# Patient Record
Sex: MALE | Race: WHITE | Employment: OTHER | URBAN - METROPOLITAN AREA
[De-identification: names, ages, dates, MRNs, and addresses within clinical notes are randomized per-mention and may not be internally consistent; named-entity substitution may affect disease eponyms.]

---

## 2020-02-24 ENCOUNTER — HOSPITAL ENCOUNTER (OUTPATIENT)
Age: 65
Discharge: HOME | End: 2020-02-26

## 2020-02-24 PROCEDURE — 88305 TISSUE EXAM BY PATHOLOGIST: CPT

## 2020-06-23 ENCOUNTER — APPOINTMENT (OUTPATIENT)
Dept: GENERAL RADIOLOGY | Age: 65
End: 2020-06-23
Payer: MEDICARE

## 2020-06-23 ENCOUNTER — HOSPITAL ENCOUNTER (EMERGENCY)
Age: 65
Discharge: HOME | End: 2020-06-24
Attending: EMERGENCY MEDICINE
Payer: MEDICARE

## 2020-06-23 ENCOUNTER — APPOINTMENT (OUTPATIENT)
Dept: ULTRASOUND IMAGING | Age: 65
End: 2020-06-23
Payer: MEDICARE

## 2020-06-23 VITALS
DIASTOLIC BLOOD PRESSURE: 74 MMHG | TEMPERATURE: 98.2 F | OXYGEN SATURATION: 96 % | WEIGHT: 225 LBS | HEART RATE: 78 BPM | RESPIRATION RATE: 18 BRPM | BODY MASS INDEX: 32.21 KG/M2 | HEIGHT: 70 IN | SYSTOLIC BLOOD PRESSURE: 124 MMHG

## 2020-06-23 LAB
ALBUMIN SERPL-MCNC: 4.1 G/DL (ref 3.5–5.2)
ALP BLD-CCNC: 215 U/L (ref 40–129)
ALT SERPL-CCNC: 131 U/L (ref 0–40)
AMORPHOUS: ABNORMAL
ANION GAP SERPL CALCULATED.3IONS-SCNC: 11 MMOL/L (ref 7–16)
AST SERPL-CCNC: 110 U/L (ref 0–39)
BACTERIA: ABNORMAL /HPF
BASOPHILS ABSOLUTE: 0.04 E9/L (ref 0–0.2)
BASOPHILS RELATIVE PERCENT: 0.4 % (ref 0–2)
BILIRUB SERPL-MCNC: 0.6 MG/DL (ref 0–1.2)
BILIRUBIN URINE: ABNORMAL
BLOOD, URINE: NEGATIVE
BUN BLDV-MCNC: 21 MG/DL (ref 8–23)
CALCIUM SERPL-MCNC: 9.4 MG/DL (ref 8.6–10.2)
CELLULAR CASTS: ABNORMAL /LPF
CHLORIDE BLD-SCNC: 101 MMOL/L (ref 98–107)
CLARITY: CLEAR
CO2: 24 MMOL/L (ref 22–29)
COARSE CASTS, UA: ABNORMAL /LPF (ref 0–2)
COLOR: ABNORMAL
CREAT SERPL-MCNC: 1.3 MG/DL (ref 0.7–1.2)
EOSINOPHILS ABSOLUTE: 0.02 E9/L (ref 0.05–0.5)
EOSINOPHILS RELATIVE PERCENT: 0.2 % (ref 0–6)
EPITHELIAL CELLS, UA: ABNORMAL /HPF
GFR AFRICAN AMERICAN: >60
GFR NON-AFRICAN AMERICAN: 55 ML/MIN/1.73
GLUCOSE BLD-MCNC: 108 MG/DL (ref 74–99)
GLUCOSE URINE: NEGATIVE MG/DL
HCT VFR BLD CALC: 42.8 % (ref 37–54)
HEMOGLOBIN: 14.6 G/DL (ref 12.5–16.5)
HYALINE CASTS: ABNORMAL /LPF (ref 0–2)
IMMATURE GRANULOCYTES #: 0.04 E9/L
IMMATURE GRANULOCYTES %: 0.4 % (ref 0–5)
KETONES, URINE: ABNORMAL MG/DL
LACTIC ACID, SEPSIS: 0.9 MMOL/L (ref 0.5–1.9)
LEUKOCYTE ESTERASE, URINE: NEGATIVE
LYMPHOCYTES ABSOLUTE: 1.45 E9/L (ref 1.5–4)
LYMPHOCYTES RELATIVE PERCENT: 14.2 % (ref 20–42)
MCH RBC QN AUTO: 30.6 PG (ref 26–35)
MCHC RBC AUTO-ENTMCNC: 34.1 % (ref 32–34.5)
MCV RBC AUTO: 89.7 FL (ref 80–99.9)
MONOCYTES ABSOLUTE: 0.69 E9/L (ref 0.1–0.95)
MONOCYTES RELATIVE PERCENT: 6.7 % (ref 2–12)
MUCUS: PRESENT /LPF
NEUTROPHILS ABSOLUTE: 7.99 E9/L (ref 1.8–7.3)
NEUTROPHILS RELATIVE PERCENT: 78.1 % (ref 43–80)
NITRITE, URINE: NEGATIVE
PDW BLD-RTO: 13.1 FL (ref 11.5–15)
PH UA: 6 (ref 5–9)
PLATELET # BLD: 190 E9/L (ref 130–450)
PMV BLD AUTO: 11.3 FL (ref 7–12)
POTASSIUM SERPL-SCNC: 4 MMOL/L (ref 3.5–5)
PROTEIN UA: 100 MG/DL
RBC # BLD: 4.77 E12/L (ref 3.8–5.8)
RBC UA: ABNORMAL /HPF (ref 0–2)
SODIUM BLD-SCNC: 136 MMOL/L (ref 132–146)
SPECIFIC GRAVITY UA: >=1.03 (ref 1–1.03)
TOTAL PROTEIN: 8 G/DL (ref 6.4–8.3)
UROBILINOGEN, URINE: 1 E.U./DL
WBC # BLD: 10.2 E9/L (ref 4.5–11.5)
WBC UA: ABNORMAL /HPF (ref 0–5)

## 2020-06-23 PROCEDURE — 76705 ECHO EXAM OF ABDOMEN: CPT

## 2020-06-23 PROCEDURE — 99284 EMERGENCY DEPT VISIT MOD MDM: CPT

## 2020-06-23 PROCEDURE — U0003 INFECTIOUS AGENT DETECTION BY NUCLEIC ACID (DNA OR RNA); SEVERE ACUTE RESPIRATORY SYNDROME CORONAVIRUS 2 (SARS-COV-2) (CORONAVIRUS DISEASE [COVID-19]), AMPLIFIED PROBE TECHNIQUE, MAKING USE OF HIGH THROUGHPUT TECHNOLOGIES AS DESCRIBED BY CMS-2020-01-R: HCPCS

## 2020-06-23 PROCEDURE — 71045 X-RAY EXAM CHEST 1 VIEW: CPT

## 2020-06-23 PROCEDURE — 80053 COMPREHEN METABOLIC PANEL: CPT

## 2020-06-23 PROCEDURE — 93005 ELECTROCARDIOGRAM TRACING: CPT | Performed by: EMERGENCY MEDICINE

## 2020-06-23 PROCEDURE — 2580000003 HC RX 258: Performed by: EMERGENCY MEDICINE

## 2020-06-23 PROCEDURE — 83605 ASSAY OF LACTIC ACID: CPT

## 2020-06-23 PROCEDURE — 87040 BLOOD CULTURE FOR BACTERIA: CPT

## 2020-06-23 PROCEDURE — 85025 COMPLETE CBC W/AUTO DIFF WBC: CPT

## 2020-06-23 PROCEDURE — 81001 URINALYSIS AUTO W/SCOPE: CPT

## 2020-06-23 PROCEDURE — 6370000000 HC RX 637 (ALT 250 FOR IP): Performed by: EMERGENCY MEDICINE

## 2020-06-23 RX ORDER — 0.9 % SODIUM CHLORIDE 0.9 %
1000 INTRAVENOUS SOLUTION INTRAVENOUS ONCE
Status: COMPLETED | OUTPATIENT
Start: 2020-06-23 | End: 2020-06-23

## 2020-06-23 RX ORDER — ACETAMINOPHEN 500 MG
1000 TABLET ORAL ONCE
Status: COMPLETED | OUTPATIENT
Start: 2020-06-23 | End: 2020-06-23

## 2020-06-23 RX ADMIN — ACETAMINOPHEN 1000 MG: 500 TABLET ORAL at 20:12

## 2020-06-23 RX ADMIN — SODIUM CHLORIDE 1000 ML: 9 INJECTION, SOLUTION INTRAVENOUS at 20:05

## 2020-06-23 ASSESSMENT — PAIN SCALES - GENERAL
PAINLEVEL_OUTOF10: 0
PAINLEVEL_OUTOF10: 0

## 2020-06-23 NOTE — ED PROVIDER NOTES
EXAM--------------------------------------    Constitutional/General: Alert and oriented x3, well appearing, non toxic in NAD; smiling, pleasant  Head: Normocephalic and atraumatic  Eyes: PERRL, EOMI, conjunctive normal, sclera non icteric  Mouth: Oropharynx clear, handling secretions, no trismus, no asymmetry of the posterior oropharynx or uvular edema  Neck: Supple, full ROM, non tender to palpation in the midline, no stridor, no crepitus, no meningeal signs  Respiratory: Lungs clear to auscultation bilaterally, no wheezes, rales, or rhonchi. Not in respiratory distress  Cardiovascular:  Regular rate. Regular rhythm. No murmurs, gallops, or rubs. 2+ distal pulses  Chest: No chest wall tenderness  GI:  Abdomen Soft, Non tender, Non distended. +BS. No organomegaly, no palpable masses,  No rebound, guarding, or rigidity. Park sign, no McBurney's point tenderness. Musculoskeletal: Moves all extremities x 4. Warm and well perfused, no clubbing, cyanosis, or edema. Capillary refill <3 seconds  Integument: skin warm and dry. No rashes. Lymphatic: no lymphadenopathy noted  Neurologic: GCS 15, no focal deficits, symmetric strength 5/5 in the upper and lower extremities bilaterally  Psychiatric: Normal Affect    -------------------------------------------------- RESULTS -------------------------------------------------  I have personally reviewed all laboratory and imaging results for this patient. Results are listed below.      LABS:  Results for orders placed or performed during the hospital encounter of 06/23/20   CBC auto differential   Result Value Ref Range    WBC 10.2 4.5 - 11.5 E9/L    RBC 4.77 3.80 - 5.80 E12/L    Hemoglobin 14.6 12.5 - 16.5 g/dL    Hematocrit 42.8 37.0 - 54.0 %    MCV 89.7 80.0 - 99.9 fL    MCH 30.6 26.0 - 35.0 pg    MCHC 34.1 32.0 - 34.5 %    RDW 13.1 11.5 - 15.0 fL    Platelets 198 065 - 187 E9/L    MPV 11.3 7.0 - 12.0 fL    Neutrophils % 78.1 43.0 - 80.0 %    Immature Granulocytes % 0.4 0.0 - 5.0 %    Lymphocytes % 14.2 (L) 20.0 - 42.0 %    Monocytes % 6.7 2.0 - 12.0 %    Eosinophils % 0.2 0.0 - 6.0 %    Basophils % 0.4 0.0 - 2.0 %    Neutrophils Absolute 7.99 (H) 1.80 - 7.30 E9/L    Immature Granulocytes # 0.04 E9/L    Lymphocytes Absolute 1.45 (L) 1.50 - 4.00 E9/L    Monocytes Absolute 0.69 0.10 - 0.95 E9/L    Eosinophils Absolute 0.02 (L) 0.05 - 0.50 E9/L    Basophils Absolute 0.04 0.00 - 0.20 E9/L   Comprehensive metabolic panel   Result Value Ref Range    Sodium 136 132 - 146 mmol/L    Potassium 4.0 3.5 - 5.0 mmol/L    Chloride 101 98 - 107 mmol/L    CO2 24 22 - 29 mmol/L    Anion Gap 11 7 - 16 mmol/L    Glucose 108 (H) 74 - 99 mg/dL    BUN 21 8 - 23 mg/dL    CREATININE 1.3 (H) 0.7 - 1.2 mg/dL    GFR Non-African American 55 >=60 mL/min/1.73    GFR African American >60     Calcium 9.4 8.6 - 10.2 mg/dL    Total Protein 8.0 6.4 - 8.3 g/dL    Alb 4.1 3.5 - 5.2 g/dL    Total Bilirubin 0.6 0.0 - 1.2 mg/dL    Alkaline Phosphatase 215 (H) 40 - 129 U/L     (H) 0 - 40 U/L     (H) 0 - 39 U/L   Lactate, Sepsis   Result Value Ref Range    Lactic Acid, Sepsis 0.9 0.5 - 1.9 mmol/L   Urinalysis with Microscopic   Result Value Ref Range    Color, UA NICOLÁS (A) Straw/Yellow    Clarity, UA Clear Clear    Glucose, Ur Negative Negative mg/dL    Bilirubin Urine SMALL (A) Negative    Ketones, Urine TRACE (A) Negative mg/dL    Specific Gravity, UA >=1.030 1.005 - 1.030    Blood, Urine Negative Negative    pH, UA 6.0 5.0 - 9.0    Protein,  (A) Negative mg/dL    Urobilinogen, Urine 1.0 <2.0 E.U./dL    Nitrite, Urine Negative Negative    Leukocyte Esterase, Urine Negative Negative    Hyaline Casts, UA 0-2 0 - 2 /LPF    Coarse Casts, UA 3-5 (A) 0 - 2 /LPF    Cellular Cast, UA 1-3 (A) None Seen /LPF    Mucus, UA Present (A) None Seen /LPF    WBC, UA 1-3 0 - 5 /HPF    RBC, UA 2-5 0 - 2 /HPF    Epithelial Cells, UA FEW /HPF    Bacteria, UA MANY (A) None Seen /HPF    Amorphous, UA MODERATE    EKG 12 Lead Result Value Ref Range    Ventricular Rate 85 BPM    Atrial Rate 85 BPM    P-R Interval 140 ms    QRS Duration 80 ms    Q-T Interval 378 ms    QTc Calculation (Bazett) 449 ms    P Axis 23 degrees    R Axis 14 degrees    T Axis -6 degrees       RADIOLOGY:  Interpreted by Radiologist.  XR CHEST PORTABLE   Final Result      1. Appearance suggests minimal vascular congestion. EKG:  This EKG is signed and interpreted by the EP. Time: 19:34  Rate: 84  Rhythm: Sinus  Interpretation: no acute changes  Comparison: no previous EKG and None      ------------------------- NURSING NOTES AND VITALS REVIEWED ---------------------------   The nursing notes within the ED encounter and vital signs as below have been reviewed by myself. BP (!) 136/92   Pulse 84   Temp 101.5 °F (38.6 °C)   Resp 18   Ht 5' 10\" (1.778 m)   Wt 225 lb (102.1 kg)   SpO2 96%   BMI 32.28 kg/m²   Oxygen Saturation Interpretation: Normal    The patients available past medical records and past encounters were reviewed. ------------------------------ ED COURSE/MEDICAL DECISION MAKING----------------------  Medications   0.9 % sodium chloride bolus (1,000 mLs Intravenous New Bag 6/23/20 2005)   acetaminophen (TYLENOL) tablet 1,000 mg (1,000 mg Oral Given 6/23/20 2012)         ED COURSE:       Medical Decision Making:   Patient is a very pleasant 67-year-old gentleman who comes in with low-grade fevers for the past 4 days. He states his been around 100 101 at home. He states he said no other symptoms. No cough, no headache, no abdominal pain, no urinary complaints. However, he has no source for his fever. He is eating and drinking well at home. Patient with a septic work-up including blood work blood cultures will check urine chest x-ray as well as COVID testing. If testing is negative, I do feel patient be able to discharged home with close outpatient follow-up with his PCP.       Patient is awaiting lab work and

## 2020-06-24 LAB
EKG ATRIAL RATE: 84 BPM
EKG P AXIS: 17 DEGREES
EKG P-R INTERVAL: 148 MS
EKG Q-T INTERVAL: 362 MS
EKG QRS DURATION: 78 MS
EKG QTC CALCULATION (BAZETT): 427 MS
EKG R AXIS: 13 DEGREES
EKG T AXIS: -6 DEGREES
EKG VENTRICULAR RATE: 84 BPM

## 2020-06-24 PROCEDURE — 93010 ELECTROCARDIOGRAM REPORT: CPT | Performed by: INTERNAL MEDICINE

## 2020-06-25 ENCOUNTER — CARE COORDINATION (OUTPATIENT)
Dept: CASE MANAGEMENT | Age: 65
End: 2020-06-25

## 2020-06-25 LAB
SARS-COV-2: NOT DETECTED
SOURCE: NORMAL

## 2020-06-26 ENCOUNTER — CARE COORDINATION (OUTPATIENT)
Dept: CASE MANAGEMENT | Age: 65
End: 2020-06-26

## 2020-06-28 LAB
BLOOD CULTURE, ROUTINE: NORMAL
CULTURE, BLOOD 2: NORMAL

## 2022-04-21 ENCOUNTER — APPOINTMENT (OUTPATIENT)
Dept: GENERAL RADIOLOGY | Age: 67
DRG: 176 | End: 2022-04-21
Payer: MEDICARE

## 2022-04-21 ENCOUNTER — APPOINTMENT (OUTPATIENT)
Dept: CT IMAGING | Age: 67
DRG: 176 | End: 2022-04-21
Payer: MEDICARE

## 2022-04-21 ENCOUNTER — HOSPITAL ENCOUNTER (INPATIENT)
Age: 67
LOS: 2 days | Discharge: HOME | DRG: 176 | End: 2022-04-23
Attending: STUDENT IN AN ORGANIZED HEALTH CARE EDUCATION/TRAINING PROGRAM | Admitting: FAMILY MEDICINE
Payer: MEDICARE

## 2022-04-21 DIAGNOSIS — I26.99 OTHER ACUTE PULMONARY EMBOLISM, UNSPECIFIED WHETHER ACUTE COR PULMONALE PRESENT (HCC): Primary | ICD-10-CM

## 2022-04-21 LAB
ALBUMIN SERPL-MCNC: 4.5 G/DL (ref 3.5–5.2)
ALP BLD-CCNC: 71 U/L (ref 40–129)
ALT SERPL-CCNC: 14 U/L (ref 0–40)
ANION GAP SERPL CALCULATED.3IONS-SCNC: 12 MMOL/L (ref 7–16)
APTT: 35.5 SEC (ref 24.5–35.1)
AST SERPL-CCNC: 17 U/L (ref 0–39)
BASOPHILS ABSOLUTE: 0.06 E9/L (ref 0–0.2)
BASOPHILS RELATIVE PERCENT: 0.4 % (ref 0–2)
BILIRUB SERPL-MCNC: 0.8 MG/DL (ref 0–1.2)
BILIRUBIN URINE: ABNORMAL
BLOOD, URINE: NEGATIVE
BUN BLDV-MCNC: 20 MG/DL (ref 6–23)
CALCIUM SERPL-MCNC: 9.8 MG/DL (ref 8.6–10.2)
CHLORIDE BLD-SCNC: 102 MMOL/L (ref 98–107)
CLARITY: CLEAR
CO2: 27 MMOL/L (ref 22–29)
COLOR: YELLOW
CREAT SERPL-MCNC: 1.2 MG/DL (ref 0.7–1.2)
D DIMER: 858 NG/ML DDU
EOSINOPHILS ABSOLUTE: 0.1 E9/L (ref 0.05–0.5)
EOSINOPHILS RELATIVE PERCENT: 0.7 % (ref 0–6)
GFR AFRICAN AMERICAN: >60
GFR NON-AFRICAN AMERICAN: >60 ML/MIN/1.73
GLUCOSE BLD-MCNC: 111 MG/DL (ref 74–99)
GLUCOSE URINE: NEGATIVE MG/DL
HCT VFR BLD CALC: 42.8 % (ref 37–54)
HCT VFR BLD CALC: 46.2 % (ref 37–54)
HEMOGLOBIN: 14.3 G/DL (ref 12.5–16.5)
HEMOGLOBIN: 15.5 G/DL (ref 12.5–16.5)
IMMATURE GRANULOCYTES #: 0.04 E9/L
IMMATURE GRANULOCYTES %: 0.3 % (ref 0–5)
KETONES, URINE: 15 MG/DL
LEUKOCYTE ESTERASE, URINE: NEGATIVE
LYMPHOCYTES ABSOLUTE: 2.18 E9/L (ref 1.5–4)
LYMPHOCYTES RELATIVE PERCENT: 15.4 % (ref 20–42)
MAGNESIUM: 2 MG/DL (ref 1.6–2.6)
MCH RBC QN AUTO: 30.4 PG (ref 26–35)
MCH RBC QN AUTO: 30.5 PG (ref 26–35)
MCHC RBC AUTO-ENTMCNC: 33.4 % (ref 32–34.5)
MCHC RBC AUTO-ENTMCNC: 33.5 % (ref 32–34.5)
MCV RBC AUTO: 90.9 FL (ref 80–99.9)
MCV RBC AUTO: 91.1 FL (ref 80–99.9)
MONOCYTES ABSOLUTE: 1.08 E9/L (ref 0.1–0.95)
MONOCYTES RELATIVE PERCENT: 7.6 % (ref 2–12)
NEUTROPHILS ABSOLUTE: 10.72 E9/L (ref 1.8–7.3)
NEUTROPHILS RELATIVE PERCENT: 75.6 % (ref 43–80)
NITRITE, URINE: NEGATIVE
PDW BLD-RTO: 12.6 FL (ref 11.5–15)
PDW BLD-RTO: 12.6 FL (ref 11.5–15)
PH UA: 6 (ref 5–9)
PLATELET # BLD: 197 E9/L (ref 130–450)
PLATELET # BLD: 223 E9/L (ref 130–450)
PMV BLD AUTO: 11.1 FL (ref 7–12)
PMV BLD AUTO: 11.3 FL (ref 7–12)
POTASSIUM SERPL-SCNC: 5 MMOL/L (ref 3.5–5)
PRO-BNP: 30 PG/ML (ref 0–125)
PROTEIN UA: NEGATIVE MG/DL
RBC # BLD: 4.7 E12/L (ref 3.8–5.8)
RBC # BLD: 5.08 E12/L (ref 3.8–5.8)
REASON FOR REJECTION: NORMAL
REJECTED TEST: NORMAL
SODIUM BLD-SCNC: 141 MMOL/L (ref 132–146)
SPECIFIC GRAVITY UA: 1.02 (ref 1–1.03)
TOTAL PROTEIN: 7.7 G/DL (ref 6.4–8.3)
TROPONIN, HIGH SENSITIVITY: 13 NG/L (ref 0–11)
TROPONIN, HIGH SENSITIVITY: 14 NG/L (ref 0–11)
UROBILINOGEN, URINE: 0.2 E.U./DL
WBC # BLD: 14.2 E9/L (ref 4.5–11.5)
WBC # BLD: 14.3 E9/L (ref 4.5–11.5)

## 2022-04-21 PROCEDURE — 6370000000 HC RX 637 (ALT 250 FOR IP): Performed by: STUDENT IN AN ORGANIZED HEALTH CARE EDUCATION/TRAINING PROGRAM

## 2022-04-21 PROCEDURE — 81003 URINALYSIS AUTO W/O SCOPE: CPT

## 2022-04-21 PROCEDURE — 2140000000 HC CCU INTERMEDIATE R&B

## 2022-04-21 PROCEDURE — 36415 COLL VENOUS BLD VENIPUNCTURE: CPT

## 2022-04-21 PROCEDURE — 2580000003 HC RX 258: Performed by: FAMILY MEDICINE

## 2022-04-21 PROCEDURE — 71275 CT ANGIOGRAPHY CHEST: CPT

## 2022-04-21 PROCEDURE — 83735 ASSAY OF MAGNESIUM: CPT

## 2022-04-21 PROCEDURE — 85027 COMPLETE CBC AUTOMATED: CPT

## 2022-04-21 PROCEDURE — 96365 THER/PROPH/DIAG IV INF INIT: CPT

## 2022-04-21 PROCEDURE — 6360000002 HC RX W HCPCS: Performed by: STUDENT IN AN ORGANIZED HEALTH CARE EDUCATION/TRAINING PROGRAM

## 2022-04-21 PROCEDURE — 80053 COMPREHEN METABOLIC PANEL: CPT

## 2022-04-21 PROCEDURE — 93005 ELECTROCARDIOGRAM TRACING: CPT | Performed by: NURSE PRACTITIONER

## 2022-04-21 PROCEDURE — 71046 X-RAY EXAM CHEST 2 VIEWS: CPT

## 2022-04-21 PROCEDURE — 83880 ASSAY OF NATRIURETIC PEPTIDE: CPT

## 2022-04-21 PROCEDURE — 2580000003 HC RX 258: Performed by: STUDENT IN AN ORGANIZED HEALTH CARE EDUCATION/TRAINING PROGRAM

## 2022-04-21 PROCEDURE — 99285 EMERGENCY DEPT VISIT HI MDM: CPT

## 2022-04-21 PROCEDURE — 85025 COMPLETE CBC W/AUTO DIFF WBC: CPT

## 2022-04-21 PROCEDURE — 85378 FIBRIN DEGRADE SEMIQUANT: CPT

## 2022-04-21 PROCEDURE — 85730 THROMBOPLASTIN TIME PARTIAL: CPT

## 2022-04-21 PROCEDURE — 6360000004 HC RX CONTRAST MEDICATION: Performed by: RADIOLOGY

## 2022-04-21 PROCEDURE — 84484 ASSAY OF TROPONIN QUANT: CPT

## 2022-04-21 RX ORDER — HEPARIN SODIUM 1000 [USP'U]/ML
80 INJECTION, SOLUTION INTRAVENOUS; SUBCUTANEOUS ONCE
Status: COMPLETED | OUTPATIENT
Start: 2022-04-21 | End: 2022-04-21

## 2022-04-21 RX ORDER — SODIUM CHLORIDE 9 MG/ML
INJECTION, SOLUTION INTRAVENOUS PRN
Status: DISCONTINUED | OUTPATIENT
Start: 2022-04-21 | End: 2022-04-23 | Stop reason: HOSPADM

## 2022-04-21 RX ORDER — 0.9 % SODIUM CHLORIDE 0.9 %
1000 INTRAVENOUS SOLUTION INTRAVENOUS ONCE
Status: COMPLETED | OUTPATIENT
Start: 2022-04-21 | End: 2022-04-21

## 2022-04-21 RX ORDER — ACETAMINOPHEN 650 MG/1
650 SUPPOSITORY RECTAL EVERY 6 HOURS PRN
Status: DISCONTINUED | OUTPATIENT
Start: 2022-04-21 | End: 2022-04-23 | Stop reason: HOSPADM

## 2022-04-21 RX ORDER — ACETAMINOPHEN 325 MG/1
650 TABLET ORAL EVERY 6 HOURS PRN
Status: DISCONTINUED | OUTPATIENT
Start: 2022-04-21 | End: 2022-04-23 | Stop reason: HOSPADM

## 2022-04-21 RX ORDER — POLYETHYLENE GLYCOL 3350 17 G/17G
17 POWDER, FOR SOLUTION ORAL DAILY PRN
Status: DISCONTINUED | OUTPATIENT
Start: 2022-04-21 | End: 2022-04-23 | Stop reason: HOSPADM

## 2022-04-21 RX ORDER — HEPARIN SODIUM 10000 [USP'U]/100ML
5-30 INJECTION, SOLUTION INTRAVENOUS CONTINUOUS
Status: DISCONTINUED | OUTPATIENT
Start: 2022-04-21 | End: 2022-04-23

## 2022-04-21 RX ORDER — HEPARIN SODIUM 1000 [USP'U]/ML
80 INJECTION, SOLUTION INTRAVENOUS; SUBCUTANEOUS PRN
Status: DISCONTINUED | OUTPATIENT
Start: 2022-04-21 | End: 2022-04-23 | Stop reason: HOSPADM

## 2022-04-21 RX ORDER — SODIUM CHLORIDE 0.9 % (FLUSH) 0.9 %
10 SYRINGE (ML) INJECTION PRN
Status: DISCONTINUED | OUTPATIENT
Start: 2022-04-21 | End: 2022-04-23 | Stop reason: HOSPADM

## 2022-04-21 RX ORDER — HEPARIN SODIUM 1000 [USP'U]/ML
40 INJECTION, SOLUTION INTRAVENOUS; SUBCUTANEOUS PRN
Status: DISCONTINUED | OUTPATIENT
Start: 2022-04-21 | End: 2022-04-23 | Stop reason: HOSPADM

## 2022-04-21 RX ORDER — ONDANSETRON 2 MG/ML
4 INJECTION INTRAMUSCULAR; INTRAVENOUS EVERY 6 HOURS PRN
Status: DISCONTINUED | OUTPATIENT
Start: 2022-04-21 | End: 2022-04-23 | Stop reason: HOSPADM

## 2022-04-21 RX ORDER — PROMETHAZINE HYDROCHLORIDE 25 MG/1
12.5 TABLET ORAL EVERY 6 HOURS PRN
Status: DISCONTINUED | OUTPATIENT
Start: 2022-04-21 | End: 2022-04-23 | Stop reason: HOSPADM

## 2022-04-21 RX ORDER — SODIUM CHLORIDE 0.9 % (FLUSH) 0.9 %
10 SYRINGE (ML) INJECTION EVERY 12 HOURS SCHEDULED
Status: DISCONTINUED | OUTPATIENT
Start: 2022-04-21 | End: 2022-04-23 | Stop reason: HOSPADM

## 2022-04-21 RX ORDER — ACETAMINOPHEN 325 MG/1
650 TABLET ORAL ONCE
Status: COMPLETED | OUTPATIENT
Start: 2022-04-21 | End: 2022-04-21

## 2022-04-21 RX ADMIN — HEPARIN SODIUM 8170 UNITS: 1000 INJECTION INTRAVENOUS; SUBCUTANEOUS at 19:11

## 2022-04-21 RX ADMIN — ACETAMINOPHEN 650 MG: 325 TABLET ORAL at 19:00

## 2022-04-21 RX ADMIN — Medication 10 ML: at 22:09

## 2022-04-21 RX ADMIN — SODIUM CHLORIDE 1000 ML: 9 INJECTION, SOLUTION INTRAVENOUS at 18:30

## 2022-04-21 RX ADMIN — Medication 18 UNITS/KG/HR: at 19:11

## 2022-04-21 RX ADMIN — IOPAMIDOL 70 ML: 755 INJECTION, SOLUTION INTRAVENOUS at 18:01

## 2022-04-21 ASSESSMENT — PAIN - FUNCTIONAL ASSESSMENT: PAIN_FUNCTIONAL_ASSESSMENT: 0-10

## 2022-04-21 ASSESSMENT — PAIN SCALES - GENERAL
PAINLEVEL_OUTOF10: 1
PAINLEVEL_OUTOF10: 6
PAINLEVEL_OUTOF10: 0
PAINLEVEL_OUTOF10: 0

## 2022-04-21 ASSESSMENT — PAIN DESCRIPTION - LOCATION: LOCATION: CHEST

## 2022-04-21 NOTE — ED NOTES
Department of Emergency Medicine  FIRST PROVIDER TRIAGE NOTE             Independent MLP           4/21/22  2:52 PM EDT    Date of Encounter: 4/21/22   MRN: [id number]      HPI: Asa Mariee is a 78 y.o. male who presents to the ED for Chest Pain (sent in from Dr Triny Shelton office, abonormal ekg, left sided cp, no lightheaded/dizziness, no sob )  Complaints of left-sided chest pain which he states began earlier today when he was walking the dog. He states the pain is worse when he leans forward. He did have some abdominal pain of the last couple days now complaining of a little bit of pain to the mid scapular region. Denies any lightheaded or dizziness. Denies any shortness of breath. He was seen by primary care physician earlier today and was subsequently sent here for further evaluation. He has no past medical history and is not on any daily medications. He took ibuprofen yesterday. ROS: Negative for sob. PE: Gen Appearance/Constitutional: alert  CV: tachycardia     Initial Plan of Care: All treatment areas with department are currently occupied. Plan to order/Initiate the following while awaiting opening in ED: labs, EKG and imaging studies.   Initiate Treatment-Testing, Proceed toTreatment Area When Bed Available for ED Attending/MLP to Continue Care    Electronically signed by LEOBARDO Mckinney CNP   DD: 4/21/22         LEOBARDO Wolf CNP  04/21/22 [id number]

## 2022-04-21 NOTE — ED PROVIDER NOTES
Department of Emergency Medicine   ED  Provider Note  Admit Date/RoomTime: 4/21/2022  5:16 PM  ED Room: 6502/6502-A          History of Present Illness:  4/21/22, Time: 6:29 PM EDT  Chief Complaint   Patient presents with    Chest Pain     sent in from Dr Margareth Ramirez office, abonormal ekg, left sided cp, no lightheaded/dizziness, no sob        Albino Juju is a 78 y.o. male presenting to the ED for chest pain. The patient states that he has been having chest pain seems to worsen with exertion. Nothing improves it. Yesterday he did have some back pain but this is resolved. No history of trauma. No numbness, tingling or weakness. He does feel short of breath with exertion. No fevers or cough. Denies any leg swelling, history of blood clots, recent travel or hormone use. No nausea, vomiting or diarrhea. .  It worsened this morning and has been more constant. He describes a throbbing heaviness that is across the front of his chest.  The patient does note that his primary care physician saw him today and felt his EKG was abnormal so sent him to the emergency department to be seen. Review of Systems:   Constitutional symptoms: Denies fever  Eyes: Denies eye pain  Ears, Nose, Mouth, Throat: Denies ear pain  Cardiovascular: Positive for chest pain  Respiratory: Positive for shortness of breath  Gastrointestinal: Denies blood per rectum  Genitourinary: Denies hematuria  Skin: Denies rashes  Neurological: Denies headache  Musculoskeletal: Denies extremity pain    --------------------------------------------- PAST HISTORY ---------------------------------------------  Past Medical History:  has no past medical history on file. Past Surgical History:  has a past surgical history that includes Tonsillectomy and fracture surgery. Social History:  reports that he has never smoked. His smokeless tobacco use includes chew. He reports previous alcohol use. He reports previous drug use.     Family History: Immature Granulocytes % 0.3 0.0 - 5.0 %    Lymphocytes % 15.4 (L) 20.0 - 42.0 %    Monocytes % 7.6 2.0 - 12.0 %    Eosinophils % 0.7 0.0 - 6.0 %    Basophils % 0.4 0.0 - 2.0 %    Neutrophils Absolute 10.72 (H) 1.80 - 7.30 E9/L    Immature Granulocytes # 0.04 E9/L    Lymphocytes Absolute 2.18 1.50 - 4.00 E9/L    Monocytes Absolute 1.08 (H) 0.10 - 0.95 E9/L    Eosinophils Absolute 0.10 0.05 - 0.50 E9/L    Basophils Absolute 0.06 0.00 - 0.20 E9/L   Comprehensive Metabolic Panel   Result Value Ref Range    Sodium 141 132 - 146 mmol/L    Potassium 5.0 3.5 - 5.0 mmol/L    Chloride 102 98 - 107 mmol/L    CO2 27 22 - 29 mmol/L    Anion Gap 12 7 - 16 mmol/L    Glucose 111 (H) 74 - 99 mg/dL    BUN 20 6 - 23 mg/dL    CREATININE 1.2 0.7 - 1.2 mg/dL    GFR Non-African American >60 >=60 mL/min/1.73    GFR African American >60     Calcium 9.8 8.6 - 10.2 mg/dL    Total Protein 7.7 6.4 - 8.3 g/dL    Albumin 4.5 3.5 - 5.2 g/dL    Total Bilirubin 0.8 0.0 - 1.2 mg/dL    Alkaline Phosphatase 71 40 - 129 U/L    ALT 14 0 - 40 U/L    AST 17 0 - 39 U/L   D-Dimer, Quantitative   Result Value Ref Range    D-Dimer, Quant 858 ng/mL DDU   Brain Natriuretic Peptide   Result Value Ref Range    Pro-BNP 30 0 - 125 pg/mL   Magnesium   Result Value Ref Range    Magnesium 2.0 1.6 - 2.6 mg/dL   Urinalysis   Result Value Ref Range    Color, UA Yellow Straw/Yellow    Clarity, UA Clear Clear    Glucose, Ur Negative Negative mg/dL    Bilirubin Urine SMALL (A) Negative    Ketones, Urine 15 (A) Negative mg/dL    Specific Gravity, UA 1.025 1.005 - 1.030    Blood, Urine Negative Negative    pH, UA 6.0 5.0 - 9.0    Protein, UA Negative Negative mg/dL    Urobilinogen, Urine 0.2 <2.0 E.U./dL    Nitrite, Urine Negative Negative    Leukocyte Esterase, Urine Negative Negative   Troponin   Result Value Ref Range    Troponin, High Sensitivity 13 (H) 0 - 11 ng/L   SPECIMEN REJECTION   Result Value Ref Range    Rejected Test trp5     Reason for Rejection see below CBC   Result Value Ref Range    WBC 14.3 (H) 4.5 - 11.5 E9/L    RBC 4.70 3.80 - 5.80 E12/L    Hemoglobin 14.3 12.5 - 16.5 g/dL    Hematocrit 42.8 37.0 - 54.0 %    MCV 91.1 80.0 - 99.9 fL    MCH 30.4 26.0 - 35.0 pg    MCHC 33.4 32.0 - 34.5 %    RDW 12.6 11.5 - 15.0 fL    Platelets 059 675 - 600 E9/L    MPV 11.1 7.0 - 12.0 fL   APTT   Result Value Ref Range    aPTT 35.5 (H) 24.5 - 35.1 sec   EKG 12 Lead   Result Value Ref Range    Ventricular Rate 109 BPM    Atrial Rate 109 BPM    P-R Interval 150 ms    QRS Duration 74 ms    Q-T Interval 338 ms    QTc Calculation (Bazett) 455 ms    P Axis 60 degrees    R Axis 31 degrees    T Axis 5 degrees   ,     RADIOLOGY:  Interpreted by Radiologist unless otherwise specified  CTA CHEST W CONTRAST   Final Result   1. Pulmonary embolism involving at least the bilateral lower lobes with   associated right heart strain. 2. Bibasilar airspace opacities likely represent atelectasis, left greater   than right. There is mild elevation of left hemidiaphragm. 3. Small left pleural effusion. Findings discussed with Kayleigh Giraldo via telephone on 04/21/2022 at 1828   hours [facility] daylight time. XR CHEST (2 VW)   Final Result   Opacities are present in left infrahilar location suggesting subsegmental   atelectasis or pneumonia. Short-term follow-up radiographic follow-up or CT   could be helpful for further evaluation.             ------------------------- NURSING NOTES AND VITALS REVIEWED ---------------------------   The nursing notes within the ED encounter and vital signs as below have been reviewed by myself  BP (!) 129/57   Pulse 84   Temp 99 °F (37.2 °C) (Oral)   Resp 18   Ht 5' 10\" (1.778 m)   Wt 237 lb 12.8 oz (107.9 kg)   SpO2 94%   BMI 34.12 kg/m²     Oxygen Saturation Interpretation: Normal    The patients available past medical records and past encounters were reviewed.       ------------------------------ ED COURSE/MEDICAL DECISION MAKING----------------------  Medications   perflutren lipid microspheres (DEFINITY) injection 1.65 mg (has no administration in time range)   heparin (porcine) injection 8,170 Units (has no administration in time range)   heparin (porcine) injection 4,080 Units (has no administration in time range)   heparin 25,000 units in dextrose 5% 250 mL (premix) infusion (18 Units/kg/hr × 102.1 kg IntraVENous Rate/Dose Verify 4/21/22 2149)   sodium chloride flush 0.9 % injection 10 mL (10 mLs IntraVENous Given 4/21/22 2209)   sodium chloride flush 0.9 % injection 10 mL (has no administration in time range)   0.9 % sodium chloride infusion (has no administration in time range)   promethazine (PHENERGAN) tablet 12.5 mg (has no administration in time range)     Or   ondansetron (ZOFRAN) injection 4 mg (has no administration in time range)   polyethylene glycol (GLYCOLAX) packet 17 g (has no administration in time range)   acetaminophen (TYLENOL) tablet 650 mg (has no administration in time range)     Or   acetaminophen (TYLENOL) suppository 650 mg (has no administration in time range)   0.9 % sodium chloride bolus (0 mLs IntraVENous Stopped 4/21/22 2026)   iopamidol (ISOVUE-370) 76 % injection 70 mL (70 mLs IntraVENous Given 4/21/22 1801)   acetaminophen (TYLENOL) tablet 650 mg (650 mg Oral Given by Other 4/21/22 1900)   heparin (porcine) injection 8,170 Units (8,170 Units IntraVENous Given 4/21/22 1911)       Medical Decision Making: This is a 78 y.o. male presenting to the ED for chest pain. On initial presentation, the patient's vital signs are interpreted as hypertensive, tachycardic and saturating well on room air. He is borderline febrile. Based on history and physical exam, we have a broad differential.  We are concerned for ACS, arrhythmia, pneumonia, and cannot exclude PE. Chest x-ray, EKG and laboratory studies obtained. Patient hydrated with a liter bolus and given Tylenol for his temperature.     A 12-lead EKG was performed and interpreted by myself. The rate is 109 with sinus tachycardia and normal axis. Patient has a T wave inversion in lead III. There is RSR primary as well. No acute ST elevation or depression. Good R wave progression. The KS interval is 150, QRS interval is 74, and QTC interval is 455. This is sinus tachycardia with nonspecific abnormality. Laboratory studies show electrolytes within normal limits. LFTs normal.  Patient does have a leukocytosis but no anemia. D-dimer positive. Urinalysis shows no infection. Chest x-ray shows opacities suggesting subsegmental atelectasis or pneumonia. CT shows evidence of pulmonary embolism involving the bilateral lower lobes with right heart strain. Bibasilar appears opacities which likely represent atelectasis. Small fusion. This is interpreted by radiology. Patient's vitals are stable at the bedside. He is not requiring oxygen. He is no longer tachycardic. We will obtain echocardiogram to assess further for right heart strain. As his BNP and troponin are not significant elevated we will start heparin. Pulmonology consulted and I spoke with Dr. Dann Galvan. Repeat troponin is 13. Primary care physician is unassigned. I spoke with medicine who is agreeable to the admission. I spoke with Dr. Librado Paul. Upon my evaluation, this patient had a high probability of imminent or life-threatening deterioration due to pulmonary embolism, which required my direct attention, intervention, and personal management. I have personally provided 33 minutes of critical care time excluding time spent on separately billable procedures. Time includes review of laboratory data, radiology results, discussion with consultants, and monitoring for potential decompensation. Interventions were performed as documented above.     This patient's ED course included: a personal history and physicial examination, re-evaluation prior to disposition, IV medications, cardiac monitoring and continuous pulse oximetry    This patient has been closely monitored during their ED course. Consultations:  Internal medicine, pulmonology    The cardiac monitor revealed sinus rhythm with a heart rate in the 80s as interpreted by me. The cardiac monitor was ordered secondary to the patient's chest pain and to monitor the patient for dysrhythmia. CPT [id number]    Oxygen Saturation Interpretation: 95 % on room air. Normal    Counseling:   I have spoken with the patient and spouse/SO and discussed todays results, in addition to providing specific details for the plan of care and counseling regarding the diagnosis and prognosis. Questions are answered at this time and they are agreeable with the plan.     --------------------------------- IMPRESSION AND DISPOSITION ---------------------------------    IMPRESSION  1. Other acute pulmonary embolism, unspecified whether acute cor pulmonale present ([facility] [address]        DISPOSITION  Disposition: Admit to telemetry  Patient condition is fair    IDr. Jazmyn, job the primary provider of record    NOTE: This report was transcribed using voice recognition software.  Every effort was made to ensure accuracy; however, inadvertent computerized transcription errors may be present       Jazmyn Frazier MD  04/22/22 8438

## 2022-04-21 NOTE — ED NOTES
Received report from [facility]; pt resting in bed; wife at bedside; vitals stable will monitor     Lauri William RN  04/21/22 Gabbie Bouquet

## 2022-04-22 LAB
ANION GAP SERPL CALCULATED.3IONS-SCNC: 11 MMOL/L (ref 7–16)
APTT: 194.9 SEC (ref 24.5–35.1)
APTT: 56.2 SEC (ref 24.5–35.1)
BASOPHILS ABSOLUTE: 0.06 E9/L (ref 0–0.2)
BASOPHILS RELATIVE PERCENT: 0.5 % (ref 0–2)
BUN BLDV-MCNC: 18 MG/DL (ref 6–23)
CALCIUM SERPL-MCNC: 8.9 MG/DL (ref 8.6–10.2)
CHLORIDE BLD-SCNC: 102 MMOL/L (ref 98–107)
CO2: 24 MMOL/L (ref 22–29)
CREAT SERPL-MCNC: 1.1 MG/DL (ref 0.7–1.2)
EKG ATRIAL RATE: 109 BPM
EKG P AXIS: 60 DEGREES
EKG P-R INTERVAL: 150 MS
EKG Q-T INTERVAL: 338 MS
EKG QRS DURATION: 74 MS
EKG QTC CALCULATION (BAZETT): 455 MS
EKG R AXIS: 31 DEGREES
EKG T AXIS: 5 DEGREES
EKG VENTRICULAR RATE: 109 BPM
EOSINOPHILS ABSOLUTE: 0.11 E9/L (ref 0.05–0.5)
EOSINOPHILS RELATIVE PERCENT: 0.8 % (ref 0–6)
GFR AFRICAN AMERICAN: >60
GFR NON-AFRICAN AMERICAN: >60 ML/MIN/1.73
GLUCOSE BLD-MCNC: 111 MG/DL (ref 74–99)
HCT VFR BLD CALC: 43.4 % (ref 37–54)
HEMOGLOBIN: 14.6 G/DL (ref 12.5–16.5)
IMMATURE GRANULOCYTES #: 0.07 E9/L
IMMATURE GRANULOCYTES %: 0.5 % (ref 0–5)
LYMPHOCYTES ABSOLUTE: 2.37 E9/L (ref 1.5–4)
LYMPHOCYTES RELATIVE PERCENT: 18.2 % (ref 20–42)
MCH RBC QN AUTO: 30.7 PG (ref 26–35)
MCHC RBC AUTO-ENTMCNC: 33.6 % (ref 32–34.5)
MCV RBC AUTO: 91.2 FL (ref 80–99.9)
MONOCYTES ABSOLUTE: 1.13 E9/L (ref 0.1–0.95)
MONOCYTES RELATIVE PERCENT: 8.7 % (ref 2–12)
NEUTROPHILS ABSOLUTE: 9.26 E9/L (ref 1.8–7.3)
NEUTROPHILS RELATIVE PERCENT: 71.3 % (ref 43–80)
PDW BLD-RTO: 12.7 FL (ref 11.5–15)
PLATELET # BLD: 224 E9/L (ref 130–450)
PMV BLD AUTO: 11.6 FL (ref 7–12)
POTASSIUM REFLEX MAGNESIUM: 4.3 MMOL/L (ref 3.5–5)
RBC # BLD: 4.76 E12/L (ref 3.8–5.8)
SODIUM BLD-SCNC: 137 MMOL/L (ref 132–146)
WBC # BLD: 13 E9/L (ref 4.5–11.5)

## 2022-04-22 PROCEDURE — 99223 1ST HOSP IP/OBS HIGH 75: CPT | Performed by: INTERNAL MEDICINE

## 2022-04-22 PROCEDURE — 85730 THROMBOPLASTIN TIME PARTIAL: CPT

## 2022-04-22 PROCEDURE — 6370000000 HC RX 637 (ALT 250 FOR IP): Performed by: FAMILY MEDICINE

## 2022-04-22 PROCEDURE — 85025 COMPLETE CBC W/AUTO DIFF WBC: CPT

## 2022-04-22 PROCEDURE — 2140000000 HC CCU INTERMEDIATE R&B

## 2022-04-22 PROCEDURE — 36415 COLL VENOUS BLD VENIPUNCTURE: CPT

## 2022-04-22 PROCEDURE — 6360000002 HC RX W HCPCS: Performed by: STUDENT IN AN ORGANIZED HEALTH CARE EDUCATION/TRAINING PROGRAM

## 2022-04-22 PROCEDURE — 2580000003 HC RX 258: Performed by: FAMILY MEDICINE

## 2022-04-22 PROCEDURE — 80048 BASIC METABOLIC PNL TOTAL CA: CPT

## 2022-04-22 RX ADMIN — Medication 10 ML: at 21:19

## 2022-04-22 RX ADMIN — ACETAMINOPHEN 650 MG: 325 TABLET ORAL at 23:31

## 2022-04-22 RX ADMIN — Medication 15 UNITS/KG/HR: at 10:39

## 2022-04-22 ASSESSMENT — PAIN SCALES - GENERAL: PAINLEVEL_OUTOF10: 0

## 2022-04-22 NOTE — PROGRESS NOTES
Hospitalist Progress Note      SYNOPSIS: Patient admitted on 2022 for Acute pulmonary embolism (HCC)      SUBJECTIVE:on heparin iv   Feels well    Temp (24hrs), Av °F (37.2 °C), Min:98.8 °F (37.1 °C), Max:99.4 °F (37.4 °C)    DIET: ADULT DIET; Regular  CODE: Full Code    Intake/Output Summary (Last 24 hours) at 2022 1708  Last data filed at 2022 1627  Gross per 24 hour   Intake 1730.27 ml   Output 560 ml   Net 1170.27 ml       OBJECTIVE:    /80   Pulse 87   Temp 99.1 °F (37.3 °C) (Oral)   Resp 28   Ht 5' 10\" (1.778 m)   Wt 237 lb 12.8 oz (107.9 kg)   SpO2 95%   BMI 34.12 kg/m²     General appearance: No apparent distress, appears stated age and cooperative. HEENT:  Conjunctivae/corneas clear. Neck: Supple. No jugular venous distention. Respiratory: Clear to auscultation bilaterally, normal respiratory effort  Cardiovascular: Regular rate rhythm, normal S1-S2  Abdomen: Soft, nontender, nondistended  Musculoskeletal: No clubbing, cyanosis, no bilateral lower extremity edema. Brisk capillary refill.    Skin:  No rashes  on visible skin  Neurologic: awake, alert and following commands     ASSESSMENT:    Pulmon emboli     PLAN:    hepari gt  Protocol  Pulmon recommneds transit to [address] [place] [address] in 48hrs  02 support    DISPOSITION: home     Medications:  REVIEWED DAILY    Infusion Medications    heparin (PORCINE) Infusion 15 Units/kg/hr (22 1039)    sodium chloride       Scheduled Medications    sodium chloride flush  10 mL IntraVENous 2 times per day     PRN Meds: perflutren lipid microspheres, heparin (porcine), heparin (porcine), sodium chloride flush, sodium chloride, promethazine **OR** ondansetron, polyethylene glycol, acetaminophen **OR** acetaminophen    Labs:     Recent Labs     22  1455 22  1841 22  0545   WBC 14.2* 14.3* 13.0*   HGB 15.5 14.3 14.6   HCT 46.2 42.8 43.4    197 224       Recent Labs     22  1455 22  0545    137   K 5.0 4.3    102   CO2 27 24   BUN 20 18   CREATININE 1.2 1.1   CALCIUM 9.8 8.9       Recent Labs     04/21/22  1455   PROT 7.7   ALKPHOS 71   ALT 14   AST 17   BILITOT 0.8         Radiology:   +++++++++++++++++++++++++++++++++++++++++++++++++  Danny Hall DO  [address]  +++++++++++++++++++++++++++++++++++++++++++++++++  NOTE: This report was transcribed using voice recognition software. Every effort was made to ensure accuracy; however, inadvertent computerized transcription errors may be present.

## 2022-04-22 NOTE — PATIENT CARE CONFERENCE
P Quality Flow/Interdisciplinary Rounds Progress Note        Quality Flow Rounds held on April 22, 2022    Disciplines Attending:  Bedside Nurse, ,  and Nursing Unit Leadership    Ramandeep Cho was admitted on 4/21/2022  5:16 PM    Anticipated Discharge Date:       Disposition:    Houston Score:  Houston Scale Score: 20    Readmission Risk              Risk of Unplanned Readmission:  8           Discussed patient goal for the day, patient clinical progression, and barriers to discharge.   The following Goal(s) of the Day/Commitment(s) have been identified:  Diagnostics - Report Results      Penny Jacobson RN  April 22, 2022

## 2022-04-22 NOTE — PLAN OF CARE
Problem: Safety - Adult  Goal: Free from fall injury  Outcome: Progressing     Problem: ABCDS Injury Assessment  Goal: Absence of physical injury  Outcome: Progressing

## 2022-04-22 NOTE — H&P
Hospitalist History & Physical      PCP: Jessica High III, DO    Date of Service: Pt seen/examined on 4/21/2022     Chief Complaint:  had concerns including Chest Pain (sent in from Dr Lowery Goodell office, abonormal ekg, left sided cp, no lightheaded/dizziness, no sob ). History Of Present Illness:    Mr. Johnathon Pike, a 78y.o. year old male  who  has no past medical history on file. Patient presented to the emergency department from his PCPs office with complaints of left-sided chest pain. His chest pain is worse with exertion. Also feeling short of breath with exertion. He describes it as a throbbing heaviness across the front of his chest.  Vital signs within normal limits and stable. Laboratory studies demonstrate an elevated D-dimer a 58. CT chest shows pulmonary embolism involving at least the right bilateral lower lobes with associated right heart strain. Patient was started on heparin infusion and medicine was consulted for admission. History reviewed. No pertinent past medical history. Past Surgical History:   Procedure Laterality Date    FRACTURE SURGERY      TONSILLECTOMY         Prior to Admission medications    Not on File         Allergies:  Patient has no known allergies. Social History:    TOBACCO:   reports that he has never smoked. His smokeless tobacco use includes chew. ETOH:   reports previous alcohol use. Family History:    Reviewed in detail and negative for DM, CAD, Cancer, CVA. Positive as follows\"  History reviewed. No pertinent family history. REVIEW OF SYSTEMS:   Pertinent positives as noted in the HPI. All other systems reviewed and negative. PHYSICAL EXAM:  /87   Pulse 97   Temp 99.4 °F (37.4 °C) (Oral)   Resp 27   Ht 5' 10\" (1.778 m)   Wt 225 lb (102.1 kg)   SpO2 96%   BMI 32.28 kg/m²   General appearance: No apparent distress, appears stated age and cooperative.   HEENT: Normal cephalic, atraumatic without obvious deformity. Pupils equal, round, and reactive to light. Extra ocular muscles intact. Conjunctivae/corneas clear. Neck: Supple, with full range of motion. No jugular venous distention. Trachea midline. Respiratory: Clear to auscultation bilaterally  Cardiovascular: Regular rate and rhythm  Abdomen: Soft, nontender, nondistended  Musculoskeletal: No clubbing, cyanosis, edema of bilateral lower extremities. Brisk capillary refill. Skin: Normal skin color. No rashes or lesions. Neurologic:  Neurovascularly intact without any focal sensory/motor deficits. Cranial nerves: II-XII intact, grossly non-focal.  Psychiatric: Alert and oriented, thought content appropriate, normal insight    Reviewed EKG and CXR personally      CBC:   Recent Labs     04/21/22  1455 04/21/22  1841   WBC 14.2* 14.3*   RBC 5.08 4.70   HGB 15.5 14.3   HCT 46.2 42.8   MCV 90.9 91.1   RDW 12.6 12.6    197     BMP:   Recent Labs     04/21/22  1455      K 5.0      CO2 27   BUN 20   CREATININE 1.2   MG 2.0     LFT:  Recent Labs     04/21/22  1455   PROT 7.7   ALKPHOS 71   ALT 14   AST 17   BILITOT 0.8     CE:  No results for input(s): Scooter Dec in the last 72 hours. PT/INR:   Recent Labs     04/21/22  1841   APTT 35.5*     BNP: No results for input(s): BNP in the last 72 hours.   ESR: No results found for: SEDRATE  CRP: No results found for: CRP  D Dimer:   Lab Results   Component Value Date    DDIMER 858 04/21/2022      Folate and B12: No results found for: Silvia Quails, No results found for: FOLATE  Lactic Acid: No results found for: LACTA  Thyroid Studies: No results found for: TSH, Benjy Ear    Oupatient labs:  Lab Results   Component Value Date    PSA 0.20 12/15/2015       Urinalysis:    Lab Results   Component Value Date    NITRU Negative 04/21/2022    WBCUA 1-3 06/23/2020    BACTERIA MANY 06/23/2020    RBCUA 2-5 06/23/2020    BLOODU Negative 04/21/2022    SPECGRAV 1.025 04/21/2022    GLUCOSEU Negative 04/21/2022       Imaging:  XR CHEST (2 VW)    Result Date: 4/21/2022  EXAMINATION: TWO XRAY VIEWS OF THE CHEST 4/21/2022 3:57 pm COMPARISON: June 23, 2020 HISTORY: ORDERING SYSTEM PROVIDED HISTORY: cp TECHNOLOGIST PROVIDED HISTORY: Reason for exam:->cp What reading provider will be dictating this exam?->CRC FINDINGS: Elevated appearance of the left hemidiaphragm. Opacities are present in left infrahilar location. No evidence of pleural effusion. The heart is normal in size. No pneumothorax. Opacities are present in left infrahilar location suggesting subsegmental atelectasis or pneumonia. Short-term follow-up radiographic follow-up or CT could be helpful for further evaluation. CTA CHEST W CONTRAST    Result Date: 4/21/2022  EXAMINATION: CTA OF THE CHEST 4/21/2022 6:01 pm TECHNIQUE: CTA of the chest was performed after the administration of intravenous contrast.  Multiplanar reformatted images are provided for review. MIP images are provided for review. Dose modulation, iterative reconstruction, and/or weight based adjustment of the mA/kV was utilized to reduce the radiation dose to as low as reasonably achievable. COMPARISON: None. HISTORY: ORDERING SYSTEM PROVIDED HISTORY: sob TECHNOLOGIST PROVIDED HISTORY: Reason for exam:->sob Decision Support Exception - unselect if not a suspected or confirmed emergency medical condition->Emergency Medical Condition (MA) What reading provider will be dictating this exam?->CRC FINDINGS: There is suboptimal opacification of the pulmonary arteries. The main pulmonary artery measures 170 Hounsfield units, and the ascending thoracic aorta measures 280 Hounsfield units. This limits evaluation for pulmonary embolism. However, there are filling defects within bilateral lower lobe pulmonary arteries consistent with pulmonary embolism. There is evidence of right heart strain with right ventricle to left ventricle ratio of 1.5.  There is no evidence of thoracic aortic aneurysm or dissection. There is a small left pleural effusion. No evidence of right pleural effusion or pericardial effusion. There is no evidence lymphadenopathy within the thorax. The central airways are patent. There is no pneumothorax. There is mild linear subsegmental atelectasis and/or scarring within the right lower lobe. There are patchy alveolar opacities in the left lower lobe as well as linear atelectasis and/or scarring within the lingula. There is mild volume loss with elevation of left hemidiaphragm. There is no acute abnormality within the visualized upper abdomen. There are degenerative changes within the spine. 1. Pulmonary embolism involving at least the bilateral lower lobes with associated right heart strain. 2. Bibasilar airspace opacities likely represent atelectasis, left greater than right. There is mild elevation of left hemidiaphragm. 3. Small left pleural effusion. Findings discussed with Jerod Mckee via telephone on 04/21/2022 at 1828 hours [place] daylight time. ASSESSMENT:  -Bilateral pulmonary emboli  -Chest pain in adult  -Evidence of right heart strain      PLAN:  -Admit to medicine  -Consult pulmonology  -Echocardiogram  -Heparin infusion  -Telemetry        Diet: No diet orders on file  Code Status: No Order  Surrogate decision maker confirmed with patient:   Extended Emergency Contact Information  Primary Emergency Contact: Aileen Schmidt  Address: [address]  Home Phone: [phone]  Mobile Phone: [phone]  Relation: Spouse   needed?  No    DVT Prophylaxis: []Lovenox []Heparin []PCD [] [address] []Encouraged ambulation  Disposition: []Med/Surg [] Intermediate [] ICU/CCU  Admit status: [] Observation [] Inpatient     +++++++++++++++++++++++++++++++++++++++++++++++++  Cristian Nguyen, DO  +++++++++++++++++++++++++++++++++++++++++++++++++  NOTE: This report was transcribed using voice recognition software. Every effort was made to ensure accuracy; however, inadvertent computerized transcription errors may be present.

## 2022-04-22 NOTE — CONSULTS
[facility]  Department of Internal Medicine  Division of Pulmonary, Critical Care and Sleep Medicine  Consult Note    Fabian Salas DO, MPH, [facility], [facility], [facility]  [facility] FOR [facility]      Patient: Shanice Marroquin  MRN: [id number]  : 1955    Encounter Time: 4:35 PM     Date of Admission: 2022  5:16 PM    Primary Care Physician: Ying Yao III, DO    Reason for Consultation: Acute Pulmonary Embolism     HISTORY OF PRESENT ILLNESS : Dee Baumgarten Delisio 78 y.o. male was seen in consultation regarding the above chief compliant. Shanice Marroquin is a 78 y.o. male who presents to the ED for Chest Pain (sent in from Dr Pepito Concepcion office, abonormal ekg, left sided cp, no lightheaded/dizziness, no sob )  Complaints of left-sided chest pain which he states began earlier today when he was walking the dog. He states the pain is worse when he leans forward. He did have some abdominal pain of the last couple days now complaining of a little bit of pain to the mid scapular region. Denies any lightheaded or dizziness. Denies any shortness of breath. He was seen by primary care physician earlier today and was subsequently sent here for further evaluation. He has no past medical history and is not on any daily medications. He took ibuprofen yesterday. CTA shows pulmonary embolism. Trop and BNP negative    PAST MEDICAL HISTORY:  has no past medical history on file. SURGICAL HISTORY:  has a past surgical history that includes Tonsillectomy and fracture surgery. SOCIAL HISTORY:  reports that he has never smoked. His smokeless tobacco use includes chew. He reports previous alcohol use. He reports previous drug use. FAMILY  HISTORY: family history is not on file. MEDICATIONS:    Prior to Admission medications    Not on File       ALLERGIES: Patient has no known allergies.        REVIEW OF SYSTEMS:  Otherwise negative if not reported or listed below  Constitutional:  No unanticipated weight loss. No change in sleep pattern or activity. No fevers, chills or rigors. Eyes:    No visual changes or diplopia. No scleral icterus. ENT:    No Headaches, hearing loss or vertigo. No mouth sores or sore throat. Cardiovascular:  + chest discomfort, palpitations. Respiratory:   + cough, No wheezing      No sputum production. No hemoptysis, pleuritic pain. Gastrointestinal:  No abdominal pain, appetite loss, blood in stools. No hematemesis  Genitourinary:  No dysuria, trouble voiding or hematuria. No nocturia. Musculoskeletal:   No weakness or joint complaints. Integumentary: No rashes or pruritis. Neurological:  No headache, numbness or tingling. Psychiatric:   No effect on mood, memory, mentation, or behavior. No anxiety or depression. Endocrine:   No excessive thirst, fluid intake, or urination. No tremor. Hematologic:  No abnormal bruising or bleeding. Lymphatic:  No swollen lymph nodes. Immunologic:  No hives or hx of anaphaxsis. OBJECTIVE:     PHYSICAL EXAM:   VITALS:   Vitals:    04/21/22 2147 04/21/22 2355 04/22/22 0737 04/22/22 1453   BP: 135/87 (!) 129/57 121/72 117/80   Pulse: 85 84 82 87   Resp: 18 18 16 28   Temp: 98.8 °F (37.1 °C) 99 °F (37.2 °C) 98.9 °F (37.2 °C) 99.1 °F (37.3 °C)   TempSrc: Oral Oral  Oral   SpO2: 95% 94% 92% 95%   Weight:       Height:            Intake/Output Summary (Last 24 hours) at 4/22/2022 1635  Last data filed at 4/22/2022 1627  Gross per 24 hour   Intake 1730.27 ml   Output 560 ml   Net 1170.27 ml        CONSTITUTIONAL:    A&O x 3, NAD  SKIN:     No rash, No suspicious lesions or skin discoloration  HEENT:     EOMI, MMM, No thrush  NECK:    No bruits, No JVP apprechiated  CV:      Sinus,  No murmur, No rubs, No gallops  PULMONARY:   Couse BS,  No Wheezing, No Rales, No Rhonchi      No noted egophony  ABDOMEN:     Soft, non-tender.  BS normal. No R/R/G  EXT:    No deformities . No clubbing.       + lower extremity edema, No venous stasis  PULSE:   Appears equal and palpable. PSYCHIATRIC:  Seems appropriate, No acute psycosis  MS:    No fractures, No gross weakness  NEUROLOGIC:   The clinical assessment is non-focal     DATA: IMAGING & TESTING:     LABORATORY TESTS:    CBC:   Lab Results   Component Value Date    WBC 13.0 04/22/2022    RBC 4.76 04/22/2022    HGB 14.6 04/22/2022    HCT 43.4 04/22/2022    MCV 91.2 04/22/2022    MCH 30.7 04/22/2022    MCHC 33.6 04/22/2022    RDW 12.7 04/22/2022     04/22/2022    MPV 11.6 04/22/2022     CMP:    Lab Results   Component Value Date     04/22/2022    K 4.3 04/22/2022     04/22/2022    CO2 24 04/22/2022    BUN 18 04/22/2022    CREATININE 1.1 04/22/2022    GFRAA >60 04/22/2022    LABGLOM >60 04/22/2022    GLUCOSE 111 04/22/2022    PROT 7.7 04/21/2022    LABALBU 4.5 04/21/2022    CALCIUM 8.9 04/22/2022    BILITOT 0.8 04/21/2022    ALKPHOS 71 04/21/2022    AST 17 04/21/2022    ALT 14 04/21/2022     PT/INR:  No results found for: PROTIME, INR  Troponin:  No results found for: TROPONINI     PRO-BNP:   Lab Results   Component Value Date    PROBNP 30 04/21/2022      ABGs: No results found for: PH, PO2, PCO2  Hemoglobin A1C: No components found for: HGBA1C    IMAGING:  Imaging tests were completed and reviewed and discussed radiology and care team involved and reveals       CTA CHEST W CONTRAST  Result Date: 4/21/2022  FINDINGS: There is suboptimal opacification of the pulmonary arteries. The main pulmonary artery measures 170 Hounsfield units, and the ascending thoracic aorta measures 280 Hounsfield units. This limits evaluation for pulmonary embolism. However, there are filling defects within bilateral lower lobe pulmonary arteries consistent with pulmonary embolism. There is evidence of right heart strain with right ventricle to left ventricle ratio of 1.5.  There is no evidence of thoracic aortic aneurysm or dissection. There is a small left pleural effusion. No evidence of right pleural effusion or pericardial effusion. There is no evidence lymphadenopathy within the thorax. The central airways are patent. There is no pneumothorax. There is mild linear subsegmental atelectasis and/or scarring within the right lower lobe. There are patchy alveolar opacities in the left lower lobe as well as linear atelectasis and/or scarring within the lingula. There is mild volume loss with elevation of left hemidiaphragm. There is no acute abnormality within the visualized upper abdomen. There are degenerative changes within the spine. 1. Pulmonary embolism involving at least the bilateral lower lobes with associated right heart strain. 2. Bibasilar airspace opacities likely represent atelectasis, left greater than right. There is mild elevation of left hemidiaphragm. 3. Small left pleural effusion. Findings discussed with Tammy Conway via telephone on 04/21/2022 at 1828 hours [place] daylight time. Assessment:   1. Intermediate low risk acute pulmonary embolism        Plan:  1. [address] gtt  2. In 48 hours ok to transition to lifelong anticoagulation   3.  Oxygen support and weaning        Aung Blank DO DO, MPH, [facility]  Professor of Internal Medicine  Pulmonary, Critical Care and Sleep Medicine

## 2022-04-22 NOTE — ED NOTES
Pt taken by nurse to [phone] in stable condition with all personal belongings      Shiela Phoenix, KARIN  04/21/22 7617

## 2022-04-23 VITALS
SYSTOLIC BLOOD PRESSURE: 140 MMHG | WEIGHT: 237.8 LBS | HEIGHT: 70 IN | TEMPERATURE: 99.7 F | HEART RATE: 106 BPM | RESPIRATION RATE: 18 BRPM | DIASTOLIC BLOOD PRESSURE: 79 MMHG | OXYGEN SATURATION: 98 % | BODY MASS INDEX: 34.04 KG/M2

## 2022-04-23 LAB
ANION GAP SERPL CALCULATED.3IONS-SCNC: 12 MMOL/L (ref 7–16)
APTT: 53.6 SEC (ref 24.5–35.1)
BASOPHILS ABSOLUTE: 0.06 E9/L (ref 0–0.2)
BASOPHILS RELATIVE PERCENT: 0.5 % (ref 0–2)
BUN BLDV-MCNC: 16 MG/DL (ref 6–23)
CALCIUM SERPL-MCNC: 9.3 MG/DL (ref 8.6–10.2)
CHLORIDE BLD-SCNC: 98 MMOL/L (ref 98–107)
CO2: 25 MMOL/L (ref 22–29)
CREAT SERPL-MCNC: 1.1 MG/DL (ref 0.7–1.2)
EOSINOPHILS ABSOLUTE: 0.15 E9/L (ref 0.05–0.5)
EOSINOPHILS RELATIVE PERCENT: 1.3 % (ref 0–6)
GFR AFRICAN AMERICAN: >60
GFR NON-AFRICAN AMERICAN: >60 ML/MIN/1.73
GLUCOSE BLD-MCNC: 106 MG/DL (ref 74–99)
HCT VFR BLD CALC: 42.3 % (ref 37–54)
HEMOGLOBIN: 14 G/DL (ref 12.5–16.5)
IMMATURE GRANULOCYTES #: 0.06 E9/L
IMMATURE GRANULOCYTES %: 0.5 % (ref 0–5)
LV EF: 65 %
LVEF MODALITY: NORMAL
LYMPHOCYTES ABSOLUTE: 2.96 E9/L (ref 1.5–4)
LYMPHOCYTES RELATIVE PERCENT: 25.1 % (ref 20–42)
MCH RBC QN AUTO: 30.3 PG (ref 26–35)
MCHC RBC AUTO-ENTMCNC: 33.1 % (ref 32–34.5)
MCV RBC AUTO: 91.6 FL (ref 80–99.9)
MONOCYTES ABSOLUTE: 1.14 E9/L (ref 0.1–0.95)
MONOCYTES RELATIVE PERCENT: 9.7 % (ref 2–12)
NEUTROPHILS ABSOLUTE: 7.4 E9/L (ref 1.8–7.3)
NEUTROPHILS RELATIVE PERCENT: 62.9 % (ref 43–80)
PDW BLD-RTO: 12.8 FL (ref 11.5–15)
PLATELET # BLD: 212 E9/L (ref 130–450)
PMV BLD AUTO: 11.8 FL (ref 7–12)
POTASSIUM REFLEX MAGNESIUM: 4.3 MMOL/L (ref 3.5–5)
RBC # BLD: 4.62 E12/L (ref 3.8–5.8)
SODIUM BLD-SCNC: 135 MMOL/L (ref 132–146)
WBC # BLD: 11.8 E9/L (ref 4.5–11.5)

## 2022-04-23 PROCEDURE — 85025 COMPLETE CBC W/AUTO DIFF WBC: CPT

## 2022-04-23 PROCEDURE — 85730 THROMBOPLASTIN TIME PARTIAL: CPT

## 2022-04-23 PROCEDURE — 36415 COLL VENOUS BLD VENIPUNCTURE: CPT

## 2022-04-23 PROCEDURE — 6360000002 HC RX W HCPCS: Performed by: STUDENT IN AN ORGANIZED HEALTH CARE EDUCATION/TRAINING PROGRAM

## 2022-04-23 PROCEDURE — 6370000000 HC RX 637 (ALT 250 FOR IP): Performed by: INTERNAL MEDICINE

## 2022-04-23 PROCEDURE — 2580000003 HC RX 258: Performed by: STUDENT IN AN ORGANIZED HEALTH CARE EDUCATION/TRAINING PROGRAM

## 2022-04-23 PROCEDURE — 2580000003 HC RX 258: Performed by: FAMILY MEDICINE

## 2022-04-23 PROCEDURE — 93306 TTE W/DOPPLER COMPLETE: CPT

## 2022-04-23 PROCEDURE — 99233 SBSQ HOSP IP/OBS HIGH 50: CPT | Performed by: INTERNAL MEDICINE

## 2022-04-23 PROCEDURE — 80048 BASIC METABOLIC PNL TOTAL CA: CPT

## 2022-04-23 RX ORDER — HEPARIN SODIUM 10000 [USP'U]/100ML
5-30 INJECTION, SOLUTION INTRAVENOUS CONTINUOUS
Status: DISCONTINUED | OUTPATIENT
Start: 2022-04-23 | End: 2022-04-23 | Stop reason: HOSPADM

## 2022-04-23 RX ADMIN — Medication 15 UNITS/KG/HR: at 01:37

## 2022-04-23 RX ADMIN — Medication 10 ML: at 09:00

## 2022-04-23 RX ADMIN — APIXABAN 10 MG: 5 TABLET, FILM COATED ORAL at 15:38

## 2022-04-23 NOTE — PROGRESS NOTES
Pt DC to home per order. DC instructions reviewed with both pt and spouse. All questions answered, both able to teach back. IV removed, tele monitor removed, cleaned and placed in tele drawer. All belongings taken with pt.

## 2022-04-23 NOTE — PLAN OF CARE
Problem: Safety - Adult  Goal: Free from fall injury  4/23/2022 0123 by Marko Score, RN  Outcome: Progressing     Problem: ABCDS Injury Assessment  Goal: Absence of physical injury  4/23/2022 0123 by Marko Score, RN  Outcome: Progressing

## 2022-04-23 NOTE — DISCHARGE SUMMARY
Hospitalist Discharge Summary    Patient ID: Cliff Williamson   Patient : 1955  Patient's PCP: Aubrey Sarabia III, DO    Admit Date: 2022   Admitting Physician: Estill Curling, DO    Discharge Date:  2022  Discharge Physician: Bacilio Noguera DO   Discharge Condition: Stable  Discharge Disposition: Home    History of presenting illness:cc= chest wall discomfort  Acute pulmn emboli  Low risk to [facility] course in brief:  (Please refer to daily progress notes for a comprehensive review of the hospitalization by requesting medical records)  hsopitalized  On heparin drip protocol  Remained in the hsptl 48 hrs  weanedd off 02   conlt to pulmon  Recommended 48hrs heaprin drip  TTE completed results pending    Routine course in the hospital uneventful stay  Hemodynamically stable except for some mild periodic tachycardia    Transitioned to oral apixib    Consults:   IP CONSULT TO CRITICAL CARE  IP CONSULT TO INTERNAL MEDICINE    Discharge Diagnoses:  Acute pulmon emboli -unprovoked    Discharge Instructions / Follow up:    Continued appropriate risk factor modification of blood pressure, diabetes and serum lipids will remain essential to reducing risk of future atherosclerotic development    Activity: activity as tolerated    Significant labs:  CBC:   Recent Labs     22  1841 22  0545 22  0607   WBC 14.3* 13.0* 11.8*   RBC 4.70 4.76 4.62   HGB 14.3 14.6 14.0   HCT 42.8 43.4 42.3   MCV 91.1 91.2 91.6   RDW 12.6 12.7 12.8    224 212     BMP:   Recent Labs     22  1455 22  0545 22  0607    137 135   K 5.0 4.3 4.3    102 98   CO2 27 24 25   BUN 20 18 16   CREATININE 1.2 1.1 1.1   MG 2.0  --   --      LFT:  Recent Labs     22  1455   PROT 7.7   ALKPHOS 71   ALT 14   AST 17   BILITOT 0.8     PT/INR:   Recent Labs     22  2336 22  0757 22  0607   APTT 194.9* 56.2* 53.6*         Urinalysis:    Lab Results Component Value Date    NITRU Negative 04/21/2022    WBCUA 1-3 06/23/2020    BACTERIA MANY 06/23/2020    RBCUA 2-5 06/23/2020    BLOODU Negative 04/21/2022    SPECGRAV 1.025 04/21/2022    GLUCOSEU Negative 04/21/2022       Imaging:  XR CHEST (2 VW)    Result Date: 4/21/2022  EXAMINATION: TWO XRAY VIEWS OF THE CHEST 4/21/2022 3:57 pm  Opacities are present in left infrahilar location suggesting subsegmental atelectasis or pneumonia. Short-term follow-up radiographic follow-up or CT could be helpful for further evaluation. CTA CHEST W CONTRAST    Result Date: 4/21/2022  EXAMINATION: CTA OF THE CHEST 4/21/2022 6:01 pm TECHNIQUE:  1. Pulmonary embolism involving at least the bilateral lower lobes with associated right heart strain. 2. Bibasilar airspace opacities likely represent atelectasis, left greater than right. There is mild elevation of left hemidiaphragm. 3. Small left pleural effusion. Findings discussed with Jerod Mckee via telephone on 04/21/2022 at 1828 hours [facility] daylight time. Discharge Medications:      Medication List      START taking these medications    * apixaban 5 MG Tabs tablet  Commonly known as: ELIQUIS  Take 2 tablets by mouth 2 times daily for 14 doses     * apixaban 5 MG Tabs tablet  Commonly known as: ELIQUIS  Take 1 tablet by mouth 2 times daily  Start taking on: April 30, 2022         * This list has 2 medication(s) that are the same as other medications prescribed for you. Read the directions carefully, and ask your doctor or other care provider to review them with you.                Where to Get Your Medications      These medications were sent to [address] [place] [address],[address], [place] [address]  [address] [facility]    Phone: [phone]   · apixaban 5 MG Tabs tablet  · apixaban 5 MG Tabs tablet         Time Spent on discharge is more than 35 minutes in the examination, evaluation, counseling and review of medications and discharge plan.    +++++++++++++++++++++++++++++++++++++++++++++++++  Mercedes Estrella DO  [address]  +++++++++++++++++++++++++++++++++++++++++++++++++  NOTE: This report was transcribed using voice recognition software. Every effort was made to ensure accuracy; however, inadvertent computerized transcription errors may be present.

## 2022-04-23 NOTE — PATIENT CARE CONFERENCE
P Quality Flow/Interdisciplinary Rounds Progress Note        Quality Flow Rounds held on April 23, 2022    Disciplines Attending:  Bedside Nurse and Nursing Unit Leadership    Gabriella Banks was admitted on 4/21/2022  5:16 PM    Anticipated Discharge Date:       Disposition:    Houston Score:  Houston Scale Score: 20    Readmission Risk              Risk of Unplanned Readmission:  8           Discussed patient goal for the day, patient clinical progression, and barriers to discharge.   The following Goal(s) of the Day/Commitment(s) have been identified:  keep safe and have no bleeding on [id number], RN  April 23, 2022

## 2022-04-23 NOTE — PROGRESS NOTES
[facility]  Department of Internal Medicine  Division of Pulmonary, Critical Care and Sleep Medicine  Progress Note    Marci Cason DO, MPH, FCCP, FACOI, FACP  Dorys Yarbrough MD, [facility]  [facility]      Patient: Lenore Cochran  MRN: [id number]  : 1955    Encounter Time: 1:49 PM     Date of Admission: 2022  5:16 PM    Primary Care Physician: Garry Britt III, DO    Reason for Consultation: Acute Pulmonary Embolism     SUBJECTIVE:    No complaints    OBJECTIVE:     PHYSICAL EXAM:   VITALS:   Vitals:    22 1950 22 2329 22 0801 22 1240   BP: 127/71 139/77 (!) 168/98 (!) 140/79   Pulse: 87 88 105 106   Resp: 18 18 18 18   Temp: 99.1 °F (37.3 °C) 99.5 °F (37.5 °C) 98.2 °F (36.8 °C) 99.7 °F (37.6 °C)   TempSrc: Oral Oral Oral Oral   SpO2: 97% 95% 94% 98%   Weight:       Height:            Intake/Output Summary (Last 24 hours) at 2022 1349  Last data filed at 2022 3417  Gross per 24 hour   Intake 1310.27 ml   Output 540 ml   Net 770.27 ml        CONSTITUTIONAL:    A&O x 3, NAD  SKIN:     No rash, No suspicious lesions or skin discoloration  HEENT:     EOMI, MMM, No thrush  NECK:    No bruits, No JVP apprechiated  CV:      Sinus,  No murmur, No rubs, No gallops  PULMONARY:   Couse BS,  No Wheezing, No Rales, No Rhonchi      No noted egophony  ABDOMEN:     Soft, non-tender. BS normal. No R/R/G  EXT:    No deformities . No clubbing.       + lower extremity edema, No venous stasis  PULSE:   Appears equal and palpable.   PSYCHIATRIC:  Seems appropriate, No acute psycosis  MS:    No fractures, No gross weakness  NEUROLOGIC:   The clinical assessment is non-focal     DATA: IMAGING & TESTING:     LABORATORY TESTS:    CBC:   Lab Results   Component Value Date    WBC 11.8 2022    RBC 4.62 2022    HGB 14.0 2022    HCT 42.3 2022    MCV 91.6 2022    MCH 30.3 2022    MCHC 33.1 2022 RDW 12.8 04/23/2022     04/23/2022    MPV 11.8 04/23/2022     CMP:    Lab Results   Component Value Date     04/23/2022    K 4.3 04/23/2022    CL 98 04/23/2022    CO2 25 04/23/2022    BUN 16 04/23/2022    CREATININE 1.1 04/23/2022    GFRAA >60 04/23/2022    LABGLOM >60 04/23/2022    GLUCOSE 106 04/23/2022    PROT 7.7 04/21/2022    LABALBU 4.5 04/21/2022    CALCIUM 9.3 04/23/2022    BILITOT 0.8 04/21/2022    ALKPHOS 71 04/21/2022    AST 17 04/21/2022    ALT 14 04/21/2022     PT/INR:  No results found for: PROTIME, INR  Troponin:  No results found for: TROPONINI     PRO-BNP:   Lab Results   Component Value Date    PROBNP 30 04/21/2022      ABGs: No results found for: PH, PO2, PCO2  Hemoglobin A1C: No components found for: HGBA1C    IMAGING:  Imaging tests were completed and reviewed and discussed radiology and care team involved and reveals       CTA CHEST W CONTRAST  Result Date: 4/21/2022  FINDINGS: There is suboptimal opacification of the pulmonary arteries. The main pulmonary artery measures 170 Hounsfield units, and the ascending thoracic aorta measures 280 Hounsfield units. This limits evaluation for pulmonary embolism. However, there are filling defects within bilateral lower lobe pulmonary arteries consistent with pulmonary embolism. There is evidence of right heart strain with right ventricle to left ventricle ratio of 1.5. There is no evidence of thoracic aortic aneurysm or dissection. There is a small left pleural effusion. No evidence of right pleural effusion or pericardial effusion. There is no evidence lymphadenopathy within the thorax. The central airways are patent. There is no pneumothorax. There is mild linear subsegmental atelectasis and/or scarring within the right lower lobe. There are patchy alveolar opacities in the left lower lobe as well as linear atelectasis and/or scarring within the lingula. There is mild volume loss with elevation of left hemidiaphragm.  There is no acute abnormality within the visualized upper abdomen. There are degenerative changes within the spine. 1. Pulmonary embolism involving at least the bilateral lower lobes with associated right heart strain. 2. Bibasilar airspace opacities likely represent atelectasis, left greater than right. There is mild elevation of left hemidiaphragm. 3. Small left pleural effusion. Findings discussed with Cyndi Diallo via telephone on 04/21/2022 at 1828 hours [place] daylight time. Assessment:   1. Intermediate low risk acute pulmonary embolism        Plan:  1. [address] [facility] gtt to stop, ok for eliquis and discharge   2. Stop oxygen   3.  Follow up in office, call for appointment        Marci Cason DO DO, MPH, [facility]  Professor of Internal Medicine  Pulmonary, Critical Care and Sleep Medicine

## 2022-04-23 NOTE — PROGRESS NOTES
Hospitalist Progress Note      SYNOPSIS: Patient admitted on 2022 for Acute pulmonary embolism ([address]      SUBJECTIVE:pulmon emboli  Has been on hepar drip  TTE completed results as of yet unavailable    On room air  Symptoms minimal hemodynamically stable  No resp distress noted at rest or with activity  Temp (24hrs), Av.1 °F (37.3 °C), Min:98.2 °F (36.8 °C), Max:99.7 °F (37.6 °C)    DIET: ADULT DIET;  Regular  CODE: Full Code    Intake/Output Summary (Last 24 hours) at 2022 1511  Last data filed at 2022 1507  Gross per 24 hour   Intake 1490.27 ml   Output 540 ml   Net 950.27 ml       OBJECTIVE:    BP (!) 140/79   Pulse 106   Temp 99.7 °F (37.6 °C) (Oral)   Resp 18   Ht 5' 10\" (1.778 m)   Wt 237 lb 12.8 oz (107.9 kg)   SpO2 98%   BMI 34.12 kg/m²     General appearance: not diaphoretic     Respiratory: no tachypnea at rest unlabored  respiratory effort  Cardiovascular: pulse Reg mild tachycardia  Neurologic: awake, alert and attentive  ASSESSMENT:    Pulmon emboli acute  Low risk-intermediate  Although scan suggested r heart strain    Tropon not elevated to alarming levels     Hemodynamically stable      No hypoxia     Pro BNP not elevated     No ekg signal to sggst heart starin     PLAN:    Dc to home  On apixiban 10mg po bid x 7 days   f-up with pulmon  Results of 2 d echo   after 1 week of apixib 10mg po bid    5 mg po bid    confirmed with IP pharmac at [facility]   DISPOSITION: home     Medications:  REVIEWED DAILY    Infusion Medications    heparin (PORCINE) Infusion      sodium chloride       Scheduled Medications    apixaban  10 mg Oral BID    Followed by   Susu Sands ON 2022] apixaban  5 mg Oral BID    sodium chloride flush  10 mL IntraVENous 2 times per day     PRN Meds: perflutren lipid microspheres, heparin (porcine), heparin (porcine), sodium chloride flush, sodium chloride, promethazine **OR** ondansetron, polyethylene glycol, acetaminophen **OR** acetaminophen    Labs: Recent Labs     04/21/22  1841 04/22/22  0545 04/23/22  0607   WBC 14.3* 13.0* 11.8*   HGB 14.3 14.6 14.0   HCT 42.8 43.4 42.3    224 212       Recent Labs     04/21/22  1455 04/22/22  0545 04/23/22  0607    137 135   K 5.0 4.3 4.3    102 98   CO2 27 24 25   BUN 20 18 16   CREATININE 1.2 1.1 1.1   CALCIUM 9.8 8.9 9.3       Recent Labs     04/21/22  1455   PROT 7.7   ALKPHOS 71   ALT 14   AST 17   BILITOT 0.8         Radiology:   +++++++++++++++++++++++++++++++++++++++++++++++++  DO Taran Huang Physician - [address] [facility], [place]  +++++++++++++++++++++++++++++++++++++++++++++++++  NOTE: This report was transcribed using voice recognition software. Every effort was made to ensure accuracy; however, inadvertent computerized transcription errors may be present.

## 2022-08-01 ENCOUNTER — OFFICE VISIT (OUTPATIENT)
Dept: PULMONOLOGY | Age: 67
End: 2022-08-01
Payer: MEDICARE

## 2022-08-01 VITALS
SYSTOLIC BLOOD PRESSURE: 124 MMHG | OXYGEN SATURATION: 93 % | TEMPERATURE: 97.7 F | HEIGHT: 70 IN | RESPIRATION RATE: 16 BRPM | HEART RATE: 104 BPM | BODY MASS INDEX: 33.21 KG/M2 | WEIGHT: 232 LBS | DIASTOLIC BLOOD PRESSURE: 75 MMHG

## 2022-08-01 DIAGNOSIS — I26.99 MULTIPLE PULMONARY EMBOLI (HCC): Primary | ICD-10-CM

## 2022-08-01 DIAGNOSIS — I82.4Y9 DEEP VEIN THROMBOSIS (DVT) OF PROXIMAL LOWER EXTREMITY, UNSPECIFIED CHRONICITY, UNSPECIFIED LATERALITY (HCC): ICD-10-CM

## 2022-08-01 LAB
EXPIRATORY TIME: 1.43 SEC
FEF 25-75% %PRED-PRE: 175 L/SEC
FEF 25-75% PRED: 2.54 L/SEC
FEF 25-75%-PRE: 4.46 L/SEC
FEV1 %PRED-PRE: 85 %
FEV1 PRED: 3.28 L
FEV1/FVC %PRED-PRE: 125 %
FEV1/FVC PRED: 76 %
FEV1/FVC: 96 %
FEV1: 2.81 L
FVC %PRED-PRE: 68 %
FVC PRED: 4.32 L
FVC: 2.94 L
PEF %PRED-PRE: 81 L/SEC
PEF PRED: 8.53 L/SEC
PEF-PRE: 6.97 L/SEC

## 2022-08-01 PROCEDURE — G8417 CALC BMI ABV UP PARAM F/U: HCPCS | Performed by: INTERNAL MEDICINE

## 2022-08-01 PROCEDURE — 94010 BREATHING CAPACITY TEST: CPT | Performed by: INTERNAL MEDICINE

## 2022-08-01 PROCEDURE — 4004F PT TOBACCO SCREEN RCVD TLK: CPT | Performed by: INTERNAL MEDICINE

## 2022-08-01 PROCEDURE — 1123F ACP DISCUSS/DSCN MKR DOCD: CPT | Performed by: INTERNAL MEDICINE

## 2022-08-01 PROCEDURE — 3017F COLORECTAL CA SCREEN DOC REV: CPT | Performed by: INTERNAL MEDICINE

## 2022-08-01 PROCEDURE — G8427 DOCREV CUR MEDS BY ELIG CLIN: HCPCS | Performed by: INTERNAL MEDICINE

## 2022-08-01 PROCEDURE — 99213 OFFICE O/P EST LOW 20 MIN: CPT | Performed by: INTERNAL MEDICINE

## 2022-08-01 RX ORDER — LANSOPRAZOLE 30 MG/1
30 CAPSULE, DELAYED RELEASE ORAL DAILY
Qty: 30 CAPSULE | Refills: 5 | Status: SHIPPED | OUTPATIENT
Start: 2022-08-01

## 2022-08-01 ASSESSMENT — PULMONARY FUNCTION TESTS
FVC: 2.94
FEV1/FVC: 96
FEV1_PREDICTED: 3.28
FEV1_PERCENT_PREDICTED_PRE: 85
FEV1: 2.81
FEV1/FVC_PERCENT_PREDICTED_PRE: 125
FVC_PERCENT_PREDICTED_PRE: 68
FVC_PREDICTED: 4.32
FEV1/FVC_PREDICTED: 76

## 2022-09-23 RX ORDER — OMEPRAZOLE 40 MG/1
CAPSULE, DELAYED RELEASE ORAL
Qty: 30 CAPSULE | Refills: 4 | Status: SHIPPED | OUTPATIENT
Start: 2022-09-23

## 2022-09-30 ENCOUNTER — TELEPHONE (OUTPATIENT)
Dept: PULMONOLOGY | Age: 67
End: 2022-09-30

## 2022-09-30 NOTE — TELEPHONE ENCOUNTER
Mailed a letter to patient informing him that his NM Lung Vent is scheduled for 11-10-22 at 8:30 am at the [facility]. He must arrive by 8:00 am. There is no prep for this test.    His Ultrasound of Bilateral legs is scheduled directly after his NM Lung Vent at 9:30 am. There is no prep for this test

## 2022-11-10 ENCOUNTER — HOSPITAL ENCOUNTER (OUTPATIENT)
Dept: ULTRASOUND IMAGING | Age: 67
Discharge: HOME | End: 2022-11-12
Payer: MEDICARE

## 2022-11-10 ENCOUNTER — HOSPITAL ENCOUNTER (OUTPATIENT)
Dept: NUCLEAR MEDICINE | Age: 67
Discharge: HOME | End: 2022-11-12
Payer: MEDICARE

## 2022-11-10 DIAGNOSIS — I26.99 MULTIPLE PULMONARY EMBOLI (HCC): ICD-10-CM

## 2022-11-10 DIAGNOSIS — I82.4Y9 DEEP VEIN THROMBOSIS (DVT) OF PROXIMAL LOWER EXTREMITY, UNSPECIFIED CHRONICITY, UNSPECIFIED LATERALITY (HCC): ICD-10-CM

## 2022-11-10 PROCEDURE — A9540 TC99M MAA: HCPCS | Performed by: RADIOLOGY

## 2022-11-10 PROCEDURE — 78582 LUNG VENTILAT&PERFUS IMAGING: CPT

## 2022-11-10 PROCEDURE — 3430000000 HC RX DIAGNOSTIC RADIOPHARMACEUTICAL: Performed by: RADIOLOGY

## 2022-11-10 PROCEDURE — A9539 TC99M PENTETATE: HCPCS | Performed by: RADIOLOGY

## 2022-11-10 PROCEDURE — 93970 EXTREMITY STUDY: CPT

## 2022-11-10 PROCEDURE — 78582 LUNG VENTILAT&PERFUS IMAGING: CPT | Performed by: RADIOLOGY

## 2022-11-10 RX ORDER — KIT FOR THE PREPARATION OF TECHNETIUM TC 99M PENTETATE 20 MG/1
35 INJECTION, POWDER, LYOPHILIZED, FOR SOLUTION INTRAVENOUS; RESPIRATORY (INHALATION)
Status: COMPLETED | OUTPATIENT
Start: 2022-11-10 | End: 2022-11-10

## 2022-11-10 RX ADMIN — KIT FOR THE PREPARATION OF TECHNETIUM TC 99M PENTETATE 35 MILLICURIE: 20 INJECTION, POWDER, LYOPHILIZED, FOR SOLUTION INTRAVENOUS; RESPIRATORY (INHALATION) at 08:26

## 2022-11-10 RX ADMIN — Medication 6 MILLICURIE: at 08:40

## 2022-12-15 RX ORDER — OMEPRAZOLE 40 MG/1
CAPSULE, DELAYED RELEASE ORAL
Qty: 90 CAPSULE | Refills: 1 | Status: SHIPPED | OUTPATIENT
Start: 2022-12-15

## 2022-12-30 ENCOUNTER — OFFICE VISIT (OUTPATIENT)
Dept: PULMONOLOGY | Age: 67
End: 2022-12-30

## 2022-12-30 VITALS
DIASTOLIC BLOOD PRESSURE: 96 MMHG | SYSTOLIC BLOOD PRESSURE: 127 MMHG | RESPIRATION RATE: 16 BRPM | HEIGHT: 70 IN | OXYGEN SATURATION: 94 % | HEART RATE: 96 BPM | BODY MASS INDEX: 33.93 KG/M2 | WEIGHT: 237 LBS | TEMPERATURE: 97.3 F

## 2022-12-30 DIAGNOSIS — I26.99 MULTIPLE PULMONARY EMBOLI (HCC): Primary | ICD-10-CM

## 2022-12-30 DIAGNOSIS — Z23 ENCOUNTER FOR IMMUNIZATION: ICD-10-CM

## 2022-12-30 LAB
EXPIRATORY TIME: 2.02 SEC
FEF 25-75% %PRED-PRE: 153 L/SEC
FEF 25-75% PRED: 2.52 L/SEC
FEF 25-75%-PRE: 3.86 L/SEC
FEV1 %PRED-PRE: 85 %
FEV1 PRED: 3.27 L
FEV1/FVC %PRED-PRE: 119 %
FEV1/FVC PRED: 76 %
FEV1/FVC: 91 %
FEV1: 2.8 L
FVC %PRED-PRE: 71 %
FVC PRED: 4.3 L
FVC: 3.08 L
PEF %PRED-PRE: 94 L/SEC
PEF PRED: 8.49 L/SEC
PEF-PRE: 7.98 L/SEC

## 2022-12-30 RX ORDER — LANSOPRAZOLE 30 MG/1
30 CAPSULE, DELAYED RELEASE ORAL DAILY
Qty: 90 CAPSULE | Refills: 4 | Status: SHIPPED | OUTPATIENT
Start: 2022-12-30

## 2022-12-30 ASSESSMENT — PULMONARY FUNCTION TESTS
FEV1_PERCENT_PREDICTED_PRE: 85
FEV1: 2.80
FEV1/FVC_PERCENT_PREDICTED_PRE: 119
FVC: 3.08
FVC_PERCENT_PREDICTED_PRE: 71
FVC_PREDICTED: 4.30
FEV1/FVC: 91
FEV1/FVC_PREDICTED: 76
FEV1_PREDICTED: 3.27

## 2022-12-30 NOTE — PROGRESS NOTES
[facility]  Department of Internal Medicine  Division of Pulmonary, Critical Care and Sleep Medicine  Office Note    Darien Cohen DO, MPH, FCCP, [facility], [address] [facility] , [facility] HospitalNINOSKA Moody MD, MS Liana Rodriguez, APRN-CNP    Patient: Fina Smith  MRN: [id number]  : 1955    Encounter Time: 3:13 PM     Primary Care Physician: Liliya Sams III, DO    Reason for Consultation: Acute Pulmonary Embolism    We had the pleasure of seeing Cheryl Schmidt in the [address] at [facility] regarding his pulmonary embolism. HISTORY OF PRESENT ILLNESS:   Fina Smith is a 75 y.o. male who presents to the ED for Chest Pain (sent in from Dr Cristina Colbert office, abonormal ekg, left sided CP with no lightheaded/dizziness) on 2022. He noted complaints of left-sided chest pain which he states began earlier today when he was walking the dog. He states the pain is worse when he leans forward. He did have some abdominal pain of the last couple days now complaining of a little bit of pain to the mid scapular region. Denies any lightheaded or dizziness. Denies any shortness of breath. He was seen by primary care physician earlier today and was subsequently sent here for further evaluation. He has no past medical history and is not on any daily medications. He took ibuprofen yesterday. CTA shows pulmonary embolism. Trop and BNP negative. He was discharged on NOAC and is doing well. On today's visit, Fina Smith is doing well with no chest pain, worsening or declining SOB. They did not report any new cough. He has no pleurisy, hemoptysis or unexplained weight loss. He denies any leg swelling. Fina Smith smoking history was reviewed. Testing was discussed and reviewed it in the office and reported it below in the data/laboratory section.   He underwent ultrasounds of the lower extremities and a low probability VQ scan was found as well. He has been on anticoagulation for 6 months in the discussion of stopping treatment was outlined below. In addition he did mention the bedbugs that he picked up while in the hospital but did not want to proceed forward to talking to the advocate. OBJECTIVE:     PHYSICAL EXAM:   VITALS:   Vitals:    12/30/22 1447   BP: (!) 127/96   Pulse: 96   Resp: 16   Temp: 97.3 °F (36.3 °C)   SpO2: 94%   Weight: 237 lb (107.5 kg)   Height: 5' 10\" (1.778 m)      No intake or output data in the 24 hours ending 12/30/22 1513       CONSTITUTIONAL:   A&O x 3, NAD  SKIN:     No rash, No suspicious lesions or skin discoloration  HEENT:     EOMI, MMM, No thrush  NECK:    No bruits, No JVP apprechiated  CV:      Sinus,  No murmur, No rubs, No gallops  PULMONARY:   Couse BS,  No Wheezing, No Rales, No Rhonchi      No noted egophony  ABDOMEN:     Soft, non-tender. BS normal. No R/R/G  EXT:    No deformities . No clubbing. Trace lower extremity edema, No venous stasis  PULSE:   Appears equal and palpable.   PSYCHIATRIC:  Seems appropriate, No acute psycosis  MS:    No fractures, No gross weakness  NEUROLOGIC:   The clinical assessment is non-focal     DATA: IMAGING & TESTING:     LABORATORY TESTS:    CBC:   Lab Results   Component Value Date/Time    WBC 11.8 04/23/2022 06:07 AM    RBC 4.62 04/23/2022 06:07 AM    HGB 14.0 04/23/2022 06:07 AM    HCT 42.3 04/23/2022 06:07 AM    MCV 91.6 04/23/2022 06:07 AM    MCH 30.3 04/23/2022 06:07 AM    MCHC 33.1 04/23/2022 06:07 AM    RDW 12.8 04/23/2022 06:07 AM     04/23/2022 06:07 AM    MPV 11.8 04/23/2022 06:07 AM     CMP:    Lab Results   Component Value Date/Time     04/23/2022 06:07 AM    K 4.3 04/23/2022 06:07 AM    CL 98 04/23/2022 06:07 AM    CO2 25 04/23/2022 06:07 AM    BUN 16 04/23/2022 06:07 AM    CREATININE 1.1 04/23/2022 06:07 AM    GFRAA >60 04/23/2022 06:07 AM    LABGLOM >60 04/23/2022 06:07 AM    GLUCOSE 106 04/23/2022 06:07 AM    PROT 7.7 04/21/2022 02:55 PM    LABALBU 4.5 04/21/2022 02:55 PM    CALCIUM 9.3 04/23/2022 06:07 AM    BILITOT 0.8 04/21/2022 02:55 PM    ALKPHOS 71 04/21/2022 02:55 PM    AST 17 04/21/2022 02:55 PM    ALT 14 04/21/2022 02:55 PM     PT/INR:  No results found for: PROTIME, INR  Troponin:  No results found for: TROPONINI     PRO-BNP:   Lab Results   Component Value Date    PROBNP 30 04/21/2022          IMAGING:  Imaging tests were completed and reviewed and discussed radiology and care team involved and reveals     V/Q Scan  = Low Prob  US legs  = negative    CTA CHEST W CONTRAST Result Date: 4/21/2022: FINDINGS: There is suboptimal opacification of the pulmonary arteries. The main pulmonary artery measures 170 Hounsfield units, and the ascending thoracic aorta measures 280 Hounsfield units. This limits evaluation for pulmonary embolism. However, there are filling defects within bilateral lower lobe pulmonary arteries consistent with pulmonary embolism. There is evidence of right heart strain with right ventricle to left ventricle ratio of 1.5. There is no evidence of thoracic aortic aneurysm or dissection. There is a small left pleural effusion. No evidence of right pleural effusion or pericardial effusion. There is no evidence lymphadenopathy within the thorax. The central airways are patent. There is no pneumothorax. There is mild linear subsegmental atelectasis and/or scarring within the right lower lobe. There are patchy alveolar opacities in the left lower lobe as well as linear atelectasis and/or scarring within the lingula. There is mild volume loss with elevation of left hemidiaphragm. There is no acute abnormality within the visualized upper abdomen. There are degenerative changes within the spine. Impression: 1. Pulmonary embolism involving at least the bilateral lower lobes with associated right heart strain.  2. Bibasilar airspace opacities likely represent atelectasis, left greater than right. There is mild elevation of left hemidiaphragm. 3. Small left pleural effusion. ECHOCARDIOGRAM 4/2022:   Normal left ventricle size and systolic function   Stage I diastolic dysfunction   No wall motion abnormalities   Ejection fraction is visually estimated at 65%. Assessment: Glen Galarza is a 69-year-old gentleman with low risk pulmonary embolism with no specific etiology is noted. He was treated with low molecular weight heparin in the hospital and transition to Eliquis which she is taking without complications. With this in mind we will proceed with the following. Plan: He is here for 6 months evaluation, ultrasound and VQ scan were negative-  VQ scan which was low probability and ultrasounds of the legs which were negative. We do think that his blood clot was related to COVID and thus we will stop anticoagulation and reassess. We discussed the long-term complications of pulmonary embolism including recurrence rates as discussed per the literature. I did recommend routine health screening Prevnar pneumonia vaccine will be given and routine cancer screening should be ascertained and evaluated in the near future. At the request of the patient refilled his GERD medication. We will see him as needed. His previous echocardiogram was normal with no signs of pulmonary hypertension.         Ruthann Parikh DO DO, MPH, [facility] Board  Professor of Internal Medicine  Pulmonary, Critical Care and Sleep Medicine

## 2022-12-30 NOTE — PROGRESS NOTES
Patient to follow up with physician on an as needed basis. Patient given the prevnar 20 during office visit under the direction of Dr. Zollie Lefort. Patient to stop Eliquis.

## 2023-06-06 RX ORDER — OMEPRAZOLE 40 MG/1
CAPSULE, DELAYED RELEASE ORAL
Qty: 90 CAPSULE | Refills: 1 | Status: SHIPPED | OUTPATIENT
Start: 2023-06-06

## 2023-12-06 RX ORDER — OMEPRAZOLE 40 MG/1
CAPSULE, DELAYED RELEASE ORAL
Qty: 90 CAPSULE | Refills: 1 | Status: SHIPPED | OUTPATIENT
Start: 2023-12-06

## 2024-06-04 RX ORDER — OMEPRAZOLE 40 MG/1
CAPSULE, DELAYED RELEASE ORAL
Qty: 90 CAPSULE | Refills: 1 | Status: SHIPPED | OUTPATIENT
Start: 2024-06-04

## 2024-12-02 RX ORDER — OMEPRAZOLE 40 MG/1
CAPSULE, DELAYED RELEASE ORAL
Qty: 90 CAPSULE | Refills: 1 | Status: SHIPPED | OUTPATIENT
Start: 2024-12-02

## 2025-05-27 RX ORDER — OMEPRAZOLE 40 MG/1
CAPSULE, DELAYED RELEASE ORAL DAILY
Qty: 90 CAPSULE | Refills: 1 | Status: SHIPPED | OUTPATIENT
Start: 2025-05-27